# Patient Record
Sex: FEMALE | Race: BLACK OR AFRICAN AMERICAN | Employment: OTHER | URBAN - METROPOLITAN AREA
[De-identification: names, ages, dates, MRNs, and addresses within clinical notes are randomized per-mention and may not be internally consistent; named-entity substitution may affect disease eponyms.]

---

## 2017-11-20 ENCOUNTER — APPOINTMENT (OUTPATIENT)
Dept: CT IMAGING | Age: 67
DRG: 312 | End: 2017-11-20
Attending: INTERNAL MEDICINE
Payer: MEDICARE

## 2017-11-20 ENCOUNTER — HOSPITAL ENCOUNTER (INPATIENT)
Age: 67
LOS: 3 days | Discharge: ANOTHER HOSPITAL | DRG: 312 | End: 2017-11-23
Attending: INTERNAL MEDICINE | Admitting: EMERGENCY MEDICINE
Payer: MEDICARE

## 2017-11-20 DIAGNOSIS — R55 SYNCOPE AND COLLAPSE: Primary | ICD-10-CM

## 2017-11-20 DIAGNOSIS — I49.5 SICK SINUS SYNDROME (HCC): ICD-10-CM
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
## 2017-11-20 PROBLEM — R00.1 BRADYCARDIA: Status: ACTIVE | Noted: 2017-11-20
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
## 2017-11-20 PROBLEM — S00.83XA FACIAL HEMATOMA: Status: ACTIVE | Noted: 2017-11-20

## 2017-11-20 LAB
ALBUMIN SERPL-MCNC: 3.6 G/DL (ref 3.5–5)
ALBUMIN/GLOB SERPL: 1.1 {RATIO} (ref 1.1–2.2)
ALP SERPL-CCNC: 113 U/L (ref 45–117)
ALT SERPL-CCNC: 22 U/L (ref 12–78)
ANION GAP SERPL CALC-SCNC: 8 MMOL/L (ref 5–15)
AST SERPL-CCNC: 28 U/L (ref 15–37)
ATRIAL RATE: 67 BPM
BASOPHILS # BLD: 0 K/UL (ref 0–0.1)
BASOPHILS NFR BLD: 0 % (ref 0–1)
BILIRUB SERPL-MCNC: 0.5 MG/DL (ref 0.2–1)
BUN SERPL-MCNC: 23 MG/DL (ref 6–20)
BUN/CREAT SERPL: 26 (ref 12–20)
CALCIUM SERPL-MCNC: 9.5 MG/DL (ref 8.5–10.1)
CALCULATED P AXIS, ECG09: 54 DEGREES
CALCULATED R AXIS, ECG10: 50 DEGREES
CALCULATED T AXIS, ECG11: 73 DEGREES
CHLORIDE SERPL-SCNC: 104 MMOL/L (ref 97–108)
CK MB CFR SERPL CALC: 2 % (ref 0–2.5)
CK MB SERPL-MCNC: 4.4 NG/ML (ref 5–25)
CK SERPL-CCNC: 219 U/L (ref 26–192)
CO2 SERPL-SCNC: 28 MMOL/L (ref 21–32)
CREAT SERPL-MCNC: 0.87 MG/DL (ref 0.55–1.02)
DIAGNOSIS, 93000: NORMAL
EOSINOPHIL # BLD: 0.1 K/UL (ref 0–0.4)
EOSINOPHIL NFR BLD: 1 % (ref 0–7)
ERYTHROCYTE [DISTWIDTH] IN BLOOD BY AUTOMATED COUNT: 12.2 % (ref 11.5–14.5)
GLOBULIN SER CALC-MCNC: 3.3 G/DL (ref 2–4)
GLUCOSE SERPL-MCNC: 84 MG/DL (ref 65–100)
HCT VFR BLD AUTO: 39.3 % (ref 35–47)
HGB BLD-MCNC: 12.8 G/DL (ref 11.5–16)
LYMPHOCYTES # BLD: 0.9 K/UL (ref 0.8–3.5)
LYMPHOCYTES NFR BLD: 10 % (ref 12–49)
MCH RBC QN AUTO: 30.8 PG (ref 26–34)
MCHC RBC AUTO-ENTMCNC: 32.6 G/DL (ref 30–36.5)
MCV RBC AUTO: 94.5 FL (ref 80–99)
MONOCYTES # BLD: 0.5 K/UL (ref 0–1)
MONOCYTES NFR BLD: 6 % (ref 5–13)
NEUTS SEG # BLD: 6.7 K/UL (ref 1.8–8)
NEUTS SEG NFR BLD: 83 % (ref 32–75)
P-R INTERVAL, ECG05: 116 MS
PLATELET # BLD AUTO: 197 K/UL (ref 150–400)
POTASSIUM SERPL-SCNC: 4.1 MMOL/L (ref 3.5–5.1)
PROT SERPL-MCNC: 6.9 G/DL (ref 6.4–8.2)
Q-T INTERVAL, ECG07: 442 MS
QRS DURATION, ECG06: 72 MS
QTC CALCULATION (BEZET), ECG08: 467 MS
RBC # BLD AUTO: 4.16 M/UL (ref 3.8–5.2)
SODIUM SERPL-SCNC: 140 MMOL/L (ref 136–145)
TROPONIN I SERPL-MCNC: <0.04 NG/ML
VENTRICULAR RATE, ECG03: 67 BPM
WBC # BLD AUTO: 8.1 K/UL (ref 3.6–11)

## 2017-11-20 PROCEDURE — 74011250637 HC RX REV CODE- 250/637: Performed by: EMERGENCY MEDICINE

## 2017-11-20 PROCEDURE — 82550 ASSAY OF CK (CPK): CPT | Performed by: INTERNAL MEDICINE

## 2017-11-20 PROCEDURE — 96360 HYDRATION IV INFUSION INIT: CPT

## 2017-11-20 PROCEDURE — 84484 ASSAY OF TROPONIN QUANT: CPT | Performed by: INTERNAL MEDICINE

## 2017-11-20 PROCEDURE — 65660000001 HC RM ICU INTERMED STEPDOWN

## 2017-11-20 PROCEDURE — 36415 COLL VENOUS BLD VENIPUNCTURE: CPT | Performed by: INTERNAL MEDICINE

## 2017-11-20 PROCEDURE — 80053 COMPREHEN METABOLIC PANEL: CPT | Performed by: INTERNAL MEDICINE

## 2017-11-20 PROCEDURE — 85025 COMPLETE CBC W/AUTO DIFF WBC: CPT | Performed by: INTERNAL MEDICINE

## 2017-11-20 PROCEDURE — 93005 ELECTROCARDIOGRAM TRACING: CPT

## 2017-11-20 PROCEDURE — 70450 CT HEAD/BRAIN W/O DYE: CPT

## 2017-11-20 PROCEDURE — 99285 EMERGENCY DEPT VISIT HI MDM: CPT

## 2017-11-20 PROCEDURE — 72125 CT NECK SPINE W/O DYE: CPT

## 2017-11-20 PROCEDURE — 82553 CREATINE MB FRACTION: CPT | Performed by: INTERNAL MEDICINE

## 2017-11-20 PROCEDURE — 74011250636 HC RX REV CODE- 250/636: Performed by: INTERNAL MEDICINE

## 2017-11-20 RX ORDER — ENOXAPARIN SODIUM 100 MG/ML
20 INJECTION SUBCUTANEOUS EVERY 24 HOURS
Status: DISCONTINUED | OUTPATIENT
Start: 2017-11-21 | End: 2017-11-21

## 2017-11-20 RX ORDER — ACETAMINOPHEN 325 MG/1
650 TABLET ORAL
Status: DISCONTINUED | OUTPATIENT
Start: 2017-11-20 | End: 2017-11-23 | Stop reason: HOSPADM

## 2017-11-20 RX ORDER — ATORVASTATIN CALCIUM 10 MG/1
20 TABLET, FILM COATED ORAL DAILY
Status: DISCONTINUED | OUTPATIENT
Start: 2017-11-21 | End: 2017-11-23 | Stop reason: HOSPADM

## 2017-11-20 RX ORDER — TRAMADOL HYDROCHLORIDE 50 MG/1
50 TABLET ORAL
Status: DISCONTINUED | OUTPATIENT
Start: 2017-11-20 | End: 2017-11-21

## 2017-11-20 RX ORDER — SODIUM CHLORIDE 0.9 % (FLUSH) 0.9 %
5-10 SYRINGE (ML) INJECTION AS NEEDED
Status: DISCONTINUED | OUTPATIENT
Start: 2017-11-20 | End: 2017-11-23 | Stop reason: HOSPADM

## 2017-11-20 RX ORDER — CLOPIDOGREL BISULFATE 75 MG/1
75 TABLET ORAL DAILY
Status: DISCONTINUED | OUTPATIENT
Start: 2017-11-21 | End: 2017-11-23 | Stop reason: HOSPADM

## 2017-11-20 RX ORDER — GUAIFENESIN 100 MG/5ML
81 LIQUID (ML) ORAL DAILY
Status: DISCONTINUED | OUTPATIENT
Start: 2017-11-21 | End: 2017-11-23 | Stop reason: HOSPADM

## 2017-11-20 RX ORDER — SODIUM CHLORIDE 0.9 % (FLUSH) 0.9 %
5-10 SYRINGE (ML) INJECTION EVERY 8 HOURS
Status: DISCONTINUED | OUTPATIENT
Start: 2017-11-20 | End: 2017-11-23 | Stop reason: HOSPADM

## 2017-11-20 RX ORDER — ENOXAPARIN SODIUM 100 MG/ML
40 INJECTION SUBCUTANEOUS EVERY 24 HOURS
Status: DISCONTINUED | OUTPATIENT
Start: 2017-11-20 | End: 2017-11-20 | Stop reason: DRUGHIGH

## 2017-11-20 RX ADMIN — Medication 10 ML: at 19:10

## 2017-11-20 RX ADMIN — SODIUM CHLORIDE 500 ML: 900 INJECTION, SOLUTION INTRAVENOUS at 20:28

## 2017-11-20 RX ADMIN — Medication 10 ML: at 23:11

## 2017-11-20 RX ADMIN — ACETAMINOPHEN 650 MG: 325 TABLET, FILM COATED ORAL at 23:09

## 2017-11-20 NOTE — IP AVS SNAPSHOT
[facility] 
 
 
 [address] Kylee Roman Arndt Patient: Deondre Castano MRN: [id number] JRS:[id number] My Medications TAKE these medications as instructed Instructions Each Dose to Equal  
 Morning Noon Evening Bedtime  
 acetaminophen 500 mg tablet Commonly known as:  TYLENOL Your last dose was: Your next dose is: Take 500 mg by mouth four (4) times daily as needed for Pain. 500 mg  
    
   
   
   
  
 aspirin 81 mg chewable tablet Your last dose was: Your next dose is: Take 81 mg by mouth daily. 81 mg  
    
   
   
   
  
 atorvastatin 80 mg tablet Commonly known as:  LIPITOR Your last dose was: Your next dose is: Take 80 mg by mouth daily. 80 mg  
    
   
   
   
  
 citalopram 10 mg tablet Commonly known as:  Mary Honey Your last dose was: Your next dose is: Take 10 mg by mouth daily. 10 mg  
    
   
   
   
  
 lisinopril 2.5 mg tablet Commonly known as:  Penny Hernandez Your last dose was: Your next dose is: Take 2.5 mg by mouth daily. 2.5 mg  
    
   
   
   
  
 loratadine 10 mg tablet Commonly known as:  Goldie Greenfield Your last dose was: Your next dose is: Take 10 mg by mouth daily as needed. 10 mg MIRALAX 17 gram/dose powder Generic drug:  polyethylene glycol Your last dose was: Your next dose is: Take 17 g by mouth daily as needed for Other (constipation). 17 g  
    
   
   
   
  
 mirtazapine 15 mg tablet Commonly known as:  Santiago Sow Your last dose was: Your next dose is: Take 15 mg by mouth nightly. 15 mg  
    
   
   
   
  
 nitroglycerin 0.4 mg SL tablet Commonly known as:  NITROSTAT Your last dose was: Your next dose is: 0.4 mg by SubLINGual route every five (5) minutes as needed for Chest Pain. 0.4 mg  
    
   
   
   
  
 PLAVIX 75 mg Tab Generic drug:  clopidogrel Your last dose was: Your next dose is: Take 75 mg by mouth daily. 75 mg  
    
   
   
   
  
 therapeutic multivitamin tablet Commonly known as:  [facility] Your last dose was: Your next dose is: Take 1 Tab by mouth daily. 1 Tab * trimethoprim-sulfamethoxazole 160-800 mg per tablet Commonly known as:  BACTRIM DS, SEPTRA DS Your last dose was: Your next dose is: Take 1 Tab by mouth two (2) times a day. 1 Tab * trimethoprim-sulfamethoxazole 160-800 mg per tablet Commonly known as:  BACTRIM DS, SEPTRA DS Your last dose was: Your next dose is: Take 1 Tab by mouth every twelve (12) hours for 1 day. 1 Tab * Notice: This list has 2 medication(s) that are the same as other medications prescribed for you. Read the directions carefully, and ask your doctor or other care provider to review them with you. Where to Get Your Medications Information on where to get these meds will be given to you by the nurse or doctor. ! Ask your nurse or doctor about these medications  
  trimethoprim-sulfamethoxazole 160-800 mg per tablet

## 2017-11-20 NOTE — ED PROVIDER NOTES
[address] [facility]  EMERGENCY DEPARTMENT HISTORY AND PHYSICAL EXAM         Date of Service: 11/20/2017   Patient Name: Tatum Ron   YOB: 1950  Medical Record Number: [id number]    History of Presenting Illness     Chief Complaint   Patient presents with    Syncope     Syncopal episode on the toilet. HR in the 40's at the scene. Lac above left eye        History Provided By:  patient    Additional History:   Tatum Ron is a 69 y.o. female with PMhx significant for HTN, high cholesterol, CVA, MI, and CAD who presents via EMS to the ED with cc of a syncopal episode which happened while on the toilet today PTA. Per EMS, pt's HR was in the 40s on the scene. She also c/o a contusion to her left forehead and an associated headache. Her pain is mild. Pt states she has no complaints. She denies neck pain. Pt arrives to the ED with her wound dressed by EMS. She denies taking anticoagulants. Social Hx: - Tobacco, + EtOH, - Illicit Drugs    There are no other complaints, changes or physical findings at this time. Primary Care Provider: Kellen Mejia MD     Past History     Past Medical History:   Past Medical History:   Diagnosis Date    CAD (coronary artery disease)     Coronary stent     CVA     Heart attack     High cholesterol     Hypertension     Stroke [facility])     x12 last one sept 2009        Past Surgical History:   Past Surgical History:   Procedure Laterality Date    CARDIAC SURG PROCEDURE UNLIST      balloon placed, stents removed    HX ORTHOPAEDIC      left arm surgery for fx. 2010    HX TUBAL LIGATION          Family History:   History reviewed. No pertinent family history. Social History:   Social History   Substance Use Topics    Smoking status: Former Smoker    Smokeless tobacco: Never Used    Alcohol use Yes        Allergies:    Allergies   Allergen Reactions    Iodinated Contrast- Oral And Iv Dye Hives    Motrin [Ibuprofen] Itching    Penicillins Itching        Review of Systems   Review of Systems   Constitutional: Negative for chills and fever. HENT: Negative for congestion, rhinorrhea, sneezing and sore throat. Eyes: Negative for redness and visual disturbance. Respiratory: Negative for shortness of breath. Cardiovascular: Negative for leg swelling. Gastrointestinal: Negative for abdominal pain, nausea and vomiting. Genitourinary: Negative for difficulty urinating and frequency. Musculoskeletal: Negative for back pain, myalgias, neck pain and neck stiffness. Skin: Positive for wound (contusion to left forehead). Negative for rash. Neurological: Positive for syncope and headaches. Negative for dizziness and weakness. Hematological: Negative for adenopathy. All other systems reviewed and are negative. Physical Exam  Physical Exam   Constitutional: She is oriented to person, place, and time. She appears well-developed and well-nourished. Patient is thin. HENT:   Head: Normocephalic and atraumatic. Mouth/Throat: Oropharynx is clear and moist.   Eyes: Conjunctivae and EOM are normal. Pupils are equal, round, and reactive to light. Neck: Normal range of motion. Neck supple. Cardiovascular: Regular rhythm. Bradycardia present. Exam reveals no gallop and no friction rub. Murmur heard. Pulmonary/Chest: Effort normal and breath sounds normal. No respiratory distress. She has no wheezes. She has no rales. Abdominal: Soft. Bowel sounds are normal. She exhibits no distension. There is no tenderness. There is no rebound and no guarding. Musculoskeletal: Normal range of motion. She exhibits no edema or tenderness. Lymphadenopathy:     She has no cervical adenopathy. Neurological: She is alert and oriented to person, place, and time. She has normal strength. No cranial nerve deficit or sensory deficit. She displays a negative Romberg sign.  Coordination and gait normal.   No cranial nerve deficits. Skin: Skin is warm and dry. Ecchymosis noted. No lesion and no rash noted. Rash is not urticarial. She is not diaphoretic. No erythema. Large scalp hematoma and skin abrasion to left forehead. Bruising over left eye. Psychiatric: She has a normal mood and affect. Nursing note and vitals reviewed. Medical Decision Making   I am the first provider for this patient. I reviewed the vital signs, available nursing notes, past medical history, past surgical history, family history and social history. Provider Notes: DDx: hematoma, fall, syncope, SSS, ICH, intracranial bleed     ED Course:  6:33 PM   Initial assessment performed. The patients presenting problems have been discussed, and they are in agreement with the care plan formulated and outlined with them. I have encouraged them to ask questions as they arise throughout their visit. EKG interpretation: (Preliminary) 1833  Rhythm: sinus rhythm with premature supraventricular complexes; and regular . Rate (approx.): 67; Axis: normal; AK interval: normal; QRS interval: normal ; ST/T wave: nonspecific T wave abnormality  Written by Brady Braden ED Scribe as dictated by Ros Sullivan MD    Progress Notes:  CONSULT NOTE:  9:02 PM  Ros Sullivan MD spoke with Dr. Patricia Liang,  Specialty: Cardiology   Discussed patient's hx, disposition, and available diagnostic and imaging results. Reviewed care plans. Consultant agrees with plans as outlined. He will see in consultation. CONSULT NOTE:  9:28 PM  Ros Sullivan MD spoke with Dr. Faith Will,  Specialty: Hospitalist  Discussed patient's hx, disposition, and available diagnostic and imaging results. Reviewed care plans. Consultant agrees with plans as outlined. She will admit. 9:40 PM  Patient has been updated on plan. She agrees to being admitted.      Diagnostic Study Results   Labs -      Recent Results (from the past 12 hour(s))   EKG, 12 LEAD, INITIAL    Collection Time: 11/20/17  6:33 PM   Result Value Ref Range    Ventricular Rate 67 BPM    Atrial Rate 67 BPM    P-R Interval 116 ms    QRS Duration 72 ms    Q-T Interval 442 ms    QTC Calculation (Bezet) 467 ms    Calculated P Axis 54 degrees    Calculated R Axis 50 degrees    Calculated T Axis 73 degrees    Diagnosis       AV junctional rhythm; small P wave is blended in to proximal QRS   fragmentation; this competes with sinus bradycardia  Occasional junctional pairs with mild aberrancy in the second beat  midprecordial T wave changes suggestive of ischemia; these changes are   increased since the last tracing    Confirmed by [facility] Geronimo BALBUENA ([id number]) on 11/20/2017 8:19:51 PM     CK W/ REFLX CKMB    Collection Time: 11/20/17  8:15 PM   Result Value Ref Range     (H) 26 - 192 U/L   TROPONIN I    Collection Time: 11/20/17  8:15 PM   Result Value Ref Range    Troponin-I, Qt. <0.04 <4.52 ng/mL   METABOLIC PANEL, COMPREHENSIVE    Collection Time: 11/20/17  8:15 PM   Result Value Ref Range    Sodium 140 136 - 145 mmol/L    Potassium 4.1 3.5 - 5.1 mmol/L    Chloride 104 97 - 108 mmol/L    CO2 28 21 - 32 mmol/L    Anion gap 8 5 - 15 mmol/L    Glucose 84 65 - 100 mg/dL    BUN 23 (H) 6 - 20 MG/DL    Creatinine 0.87 0.55 - 1.02 MG/DL    BUN/Creatinine ratio 26 (H) 12 - 20      GFR est AA >60 >60 ml/min/1.73m2    GFR est non-AA >60 >60 ml/min/1.73m2    Calcium 9.5 8.5 - 10.1 MG/DL    Bilirubin, total 0.5 0.2 - 1.0 MG/DL    ALT (SGPT) 22 12 - 78 U/L    AST (SGOT) 28 15 - 37 U/L    Alk.  phosphatase 113 45 - 117 U/L    Protein, total 6.9 6.4 - 8.2 g/dL    Albumin 3.6 3.5 - 5.0 g/dL    Globulin 3.3 2.0 - 4.0 g/dL    A-G Ratio 1.1 1.1 - 2.2     CBC WITH AUTOMATED DIFF    Collection Time: 11/20/17  8:15 PM   Result Value Ref Range    WBC 8.1 3.6 - 11.0 K/uL    RBC 4.16 3.80 - 5.20 M/uL    HGB 12.8 11.5 - 16.0 g/dL    HCT 39.3 35.0 - 47.0 %    MCV 94.5 80.0 - 99.0 FL    MCH 30.8 26.0 - 34.0 PG    MCHC 32.6 30.0 - 36.5 g/dL    RDW 12.2 11.5 - 14.5 %    PLATELET 614 108 - 086 K/uL    NEUTROPHILS 83 (H) 32 - 75 %    LYMPHOCYTES 10 (L) 12 - 49 %    MONOCYTES 6 5 - 13 %    EOSINOPHILS 1 0 - 7 %    BASOPHILS 0 0 - 1 %    ABS. NEUTROPHILS 6.7 1.8 - 8.0 K/UL    ABS. LYMPHOCYTES 0.9 0.8 - 3.5 K/UL    ABS. MONOCYTES 0.5 0.0 - 1.0 K/UL    ABS. EOSINOPHILS 0.1 0.0 - 0.4 K/UL    ABS. BASOPHILS 0.0 0.0 - 0.1 K/UL   CK-MB,QUANT. Collection Time: 11/20/17  8:15 PM   Result Value Ref Range    CK - MB 4.4 (H) <3.6 NG/ML    CK-MB Index 2.0 0.0 - 2.5         Radiologic Studies -  The following have been ordered and reviewed:    CT Results  (Last 48 hours)               11/20/17 1940  CT HEAD WO CONT Final result    Impression:  IMPRESSION: Chronic ischemic changes again shown. No acute intracranial   findings. Soft tissue hematoma superficial to the supraorbital left frontal   bone. Narrative:  EXAM:  CT HEAD WO CONT       INDICATION:   Syncope/fainting       COMPARISON: CT 7/1/2015. CONTRAST:  None. TECHNIQUE: Unenhanced CT of the head was performed using 5 mm images. Brain and   bone windows were generated. CT dose reduction was achieved through use of a   standardized protocol tailored for this examination and automatic exposure   control for dose modulation. FINDINGS:   Mild ex vacuo prominence of the frontal horn of the right lateral ventricles   again shown with otherwise normal ventricular size and contour appear. Abundant   bilateral periventricular white matter diminished attenuation is again shown   consistent with chronic small vessel ischemic disease the white matter. Chronic   infarction of the right external capsule and inferior left cerebellar hemisphere   is again shown. There is no acute cranial hemorrhage or mass effect. No   extra-axial collection or unenhanced CT imaging evidence for acute infarction is   shown. The basilar cisterns are open.  Prominent soft tissue hematoma overlies   the supraorbital left frontal bone. The bone windows demonstrate no   abnormalities. The visualized portions of the paranasal sinuses and mastoid air   cells are clear. 11/20/17 1940  CT SPINE CERV WO CONT Final result    Impression:  IMPRESSION:   C5-6 degenerative disc disease. No fracture or dislocation demonstrated. Narrative:  EXAM:  CT CERVICAL SPINE WITHOUT CONTRAST       INDICATION:   Neck pain following trauma. COMPARISON: None. CONTRAST:  None. TECHNIQUE: Multislice helical CT of the cervical spine was performed without   intravenous contrast administration. Sagittal and coronal reconstructions were   generated. CT dose reduction was achieved through use of a standardized   protocol tailored for this examination and automatic exposure control for dose   modulation. FINDINGS:       Bone mineralization is normal . There is normal vertebral body height. Alignment   is anatomic. Moderate disc space narrowing is present at C5-6 with otherwise   normal disc height. Apposing endplate marginal osteophytes are present at the   C5-6 level. The posterior processes and facet joints are intact. No osseous   canal or foraminal stenosis is shown. No paraspinal soft tissue mass is shown. Vital Signs-Reviewed the patient's vital signs.    Patient Vitals for the past 12 hrs:   Temp Pulse Resp BP SpO2   11/20/17 1911 - 71 16 - 99 %   11/20/17 1831 97.4 °F (36.3 °C) 68 17 144/67 100 %       Medications Given in the ED:  Medications   sodium chloride (NS) flush 5-10 mL (not administered)   sodium chloride (NS) flush 5-10 mL (10 mL IntraVENous Given 11/20/17 2010)   sodium chloride (NS) flush 5-10 mL (not administered)   sodium chloride (NS) flush 5-10 mL (not administered)   acetaminophen (TYLENOL) tablet 650 mg (not administered)   enoxaparin (LOVENOX) injection 40 mg (not administered)   atorvastatin (LIPITOR) tablet 20 mg (not administered)   traMADol (ULTRAM) tablet 50 mg (not administered)   aspirin chewable tablet 81 mg (not administered)   clopidogrel (PLAVIX) tablet 75 mg (not administered)   sodium chloride 0.9 % bolus infusion 500 mL (500 mL IntraVENous New Bag 11/20/17 2028)       Diagnosis:  Clinical Impression:   1. Syncope and collapse    2. Sick sinus syndrome ([facility] [address]         Plan:  1: Admit to Hospitalist    ADMIT NOTE:  9:28 PM  Patient is being admitted to the hospital by Dr. Parish Vitale. The results of their tests and reasons for their admission have been discussed with them and/or available family. They convey agreement and understanding for the need to be admitted and for their admission diagnosis. Consultation has been made with the inpatient physician specialist for hospitalization. _______________________________   Attestations:     Attestations:   Attestation Note:  This note is prepared by [facility], acting as Scribe for MD Remedios Chairez MD: The scribe's documentation has been prepared under my direction and personally reviewed by me in its entirety.  I confirm that the note above accurately reflects all work, treatment, procedures, and medical decision making performed by me.  _______________________________

## 2017-11-20 NOTE — ED NOTES
Emergency Department Nursing Plan of Care       The Nursing Plan of Care is developed from the Nursing assessment and Emergency Department Attending provider initial evaluation. The plan of care may be reviewed in the ED Provider note. The Plan of Care was developed with the following considerations:   Patient / Family readiness to learn indicated by:verbalized understanding  Persons(s) to be included in education: patient  Barriers to Learning/Limitations:No    Signed     Samuelra Bench    11/20/2017   6:36 PM    See nursing assessment  Patient is alert and oriented x 4 and in no acute distress at this time. Respirations are at a regular rate, depth, and pattern. Patient updated on plan of care and has no questions or concerns at this time.

## 2017-11-21 PROBLEM — E78.5 HYPERLIPIDEMIA: Status: ACTIVE | Noted: 2017-11-21

## 2017-11-21 PROBLEM — I25.10 CAD (CORONARY ARTERY DISEASE): Status: ACTIVE | Noted: 2017-11-21

## 2017-11-21 LAB
CK MB CFR SERPL CALC: 1.3 % (ref 0–2.5)
CK MB SERPL-MCNC: 5.3 NG/ML (ref 5–25)
CK SERPL-CCNC: 409 U/L (ref 26–192)
TROPONIN I SERPL-MCNC: 0.04 NG/ML

## 2017-11-21 PROCEDURE — 97162 PT EVAL MOD COMPLEX 30 MIN: CPT

## 2017-11-21 PROCEDURE — 82550 ASSAY OF CK (CPK): CPT

## 2017-11-21 PROCEDURE — 93306 TTE W/DOPPLER COMPLETE: CPT

## 2017-11-21 PROCEDURE — 36415 COLL VENOUS BLD VENIPUNCTURE: CPT

## 2017-11-21 PROCEDURE — G8978 MOBILITY CURRENT STATUS: HCPCS

## 2017-11-21 PROCEDURE — 97116 GAIT TRAINING THERAPY: CPT

## 2017-11-21 PROCEDURE — 74011250637 HC RX REV CODE- 250/637: Performed by: EMERGENCY MEDICINE

## 2017-11-21 PROCEDURE — G8979 MOBILITY GOAL STATUS: HCPCS

## 2017-11-21 PROCEDURE — 74011250636 HC RX REV CODE- 250/636: Performed by: EMERGENCY MEDICINE

## 2017-11-21 PROCEDURE — 74011250637 HC RX REV CODE- 250/637: Performed by: INTERNAL MEDICINE

## 2017-11-21 PROCEDURE — 93005 ELECTROCARDIOGRAM TRACING: CPT

## 2017-11-21 PROCEDURE — 65660000000 HC RM CCU STEPDOWN

## 2017-11-21 PROCEDURE — 84484 ASSAY OF TROPONIN QUANT: CPT

## 2017-11-21 PROCEDURE — 74011250636 HC RX REV CODE- 250/636: Performed by: INTERNAL MEDICINE

## 2017-11-21 PROCEDURE — 97530 THERAPEUTIC ACTIVITIES: CPT

## 2017-11-21 RX ORDER — LISINOPRIL 5 MG/1
2.5 TABLET ORAL DAILY
Status: DISCONTINUED | OUTPATIENT
Start: 2017-11-22 | End: 2017-11-23 | Stop reason: HOSPADM

## 2017-11-21 RX ORDER — SODIUM CHLORIDE 9 MG/ML
75 INJECTION, SOLUTION INTRAVENOUS CONTINUOUS
Status: DISCONTINUED | OUTPATIENT
Start: 2017-11-21 | End: 2017-11-22

## 2017-11-21 RX ORDER — ENOXAPARIN SODIUM 100 MG/ML
30 INJECTION SUBCUTANEOUS EVERY 24 HOURS
Status: DISCONTINUED | OUTPATIENT
Start: 2017-11-21 | End: 2017-11-23 | Stop reason: HOSPADM

## 2017-11-21 RX ORDER — CITALOPRAM 10 MG/1
10 TABLET ORAL DAILY
COMMUNITY

## 2017-11-21 RX ORDER — CITALOPRAM 20 MG/1
10 TABLET, FILM COATED ORAL DAILY
Status: DISCONTINUED | OUTPATIENT
Start: 2017-11-22 | End: 2017-11-23 | Stop reason: HOSPADM

## 2017-11-21 RX ORDER — SULFAMETHOXAZOLE AND TRIMETHOPRIM 800; 160 MG/1; MG/1
1 TABLET ORAL EVERY 12 HOURS
Status: DISCONTINUED | OUTPATIENT
Start: 2017-11-21 | End: 2017-11-23 | Stop reason: HOSPADM

## 2017-11-21 RX ORDER — POLYETHYLENE GLYCOL 3350 17 G/17G
17 POWDER, FOR SOLUTION ORAL
Status: DISCONTINUED | OUTPATIENT
Start: 2017-11-21 | End: 2017-11-23 | Stop reason: HOSPADM

## 2017-11-21 RX ORDER — MIRTAZAPINE 15 MG/1
15 TABLET, FILM COATED ORAL
Status: DISCONTINUED | OUTPATIENT
Start: 2017-11-21 | End: 2017-11-23 | Stop reason: HOSPADM

## 2017-11-21 RX ORDER — NITROGLYCERIN 0.4 MG/1
0.4 TABLET SUBLINGUAL AS NEEDED
Status: DISCONTINUED | OUTPATIENT
Start: 2017-11-21 | End: 2017-11-23 | Stop reason: HOSPADM

## 2017-11-21 RX ORDER — POLYETHYLENE GLYCOL 3350 17 G/17G
17 POWDER, FOR SOLUTION ORAL
COMMUNITY

## 2017-11-21 RX ORDER — THERA TABS 400 MCG
1 TAB ORAL DAILY
COMMUNITY

## 2017-11-21 RX ORDER — LISINOPRIL 2.5 MG/1
2.5 TABLET ORAL DAILY
COMMUNITY

## 2017-11-21 RX ORDER — SULFAMETHOXAZOLE AND TRIMETHOPRIM 800; 160 MG/1; MG/1
1 TABLET ORAL 2 TIMES DAILY
COMMUNITY
Start: 2017-11-17 | End: 2017-11-24

## 2017-11-21 RX ORDER — THERA TABS 400 MCG
1 TAB ORAL DAILY
Status: DISCONTINUED | OUTPATIENT
Start: 2017-11-21 | End: 2017-11-23 | Stop reason: HOSPADM

## 2017-11-21 RX ORDER — NITROGLYCERIN 0.4 MG/1
0.4 TABLET SUBLINGUAL
COMMUNITY

## 2017-11-21 RX ORDER — MIRTAZAPINE 15 MG/1
15 TABLET, FILM COATED ORAL
COMMUNITY

## 2017-11-21 RX ORDER — ATORVASTATIN CALCIUM 80 MG/1
80 TABLET, FILM COATED ORAL DAILY
COMMUNITY

## 2017-11-21 RX ADMIN — ACETAMINOPHEN 650 MG: 325 TABLET, FILM COATED ORAL at 09:36

## 2017-11-21 RX ADMIN — CLOPIDOGREL 75 MG: 75 TABLET, FILM COATED ORAL at 09:27

## 2017-11-21 RX ADMIN — ACETAMINOPHEN 650 MG: 325 TABLET, FILM COATED ORAL at 16:31

## 2017-11-21 RX ADMIN — ENOXAPARIN SODIUM 30 MG: 30 INJECTION, SOLUTION INTRAVENOUS; SUBCUTANEOUS at 09:27

## 2017-11-21 RX ADMIN — SODIUM CHLORIDE 75 ML/HR: 900 INJECTION, SOLUTION INTRAVENOUS at 02:24

## 2017-11-21 RX ADMIN — ASPIRIN 81 MG: 81 TABLET, CHEWABLE ORAL at 09:27

## 2017-11-21 RX ADMIN — Medication 10 ML: at 16:33

## 2017-11-21 RX ADMIN — THERA TABS 1 TABLET: TAB at 16:22

## 2017-11-21 RX ADMIN — SODIUM CHLORIDE 75 ML/HR: 900 INJECTION, SOLUTION INTRAVENOUS at 16:31

## 2017-11-21 RX ADMIN — SULFAMETHOXAZOLE AND TRIMETHOPRIM 1 TABLET: 800; 160 TABLET ORAL at 16:22

## 2017-11-21 NOTE — ED NOTES
Pt resting in bed awaiting transfer for admission, pt appears to be in no distress, will continue to monitor pt.

## 2017-11-21 NOTE — PROGRESS NOTES
[facility] DAILY ANTICOAGULANT ASSESSMENT    Estimated body mass index is 15.54 kg/(m^2) as calculated from the following:    Height as of this encounter: 170.2 cm (67\"). Weight as of this encounter: 45 kg (99 lb 3.3 oz). Estimated Creatinine Clearance: 43.4 mL/min (based on Cr of 0.87). Lab Results   Component Value Date/Time    Creatinine 0.87 11/20/2017 08:15 PM     Lab Results   Component Value Date/Time    HGB 12.8 11/20/2017 08:15 PM     Lab Results   Component Value Date/Time    PLATELET 905 19/67/1210 08:15 PM     Assessment: wt=45 kg and lovenox dose chaged from 40 mg to 30 mg daily.

## 2017-11-21 NOTE — PROGRESS NOTES
RRAT Score: 11  Initial Assessment: CM reviewed chart and met with patient for discharge planning. CM verified patients address and contact number as correct on the facesheet. Pt presented to ED with syncope and a fall. It was reported that patient fell while on the toilet. [facility] [place] [facility] Her sister lives next door and her niece assists as well. Patient reported that her niece manages her monthly income, which is around $1000. Patient is retired. Patient consented for CM to make appointment arrangements. Patients PCP is Dr. Charbel Browne at [facility]. CM has notified [facility] and will obtain an appointment once patient's discharge date is known. Patient will not need assistance with obtaining medications. Patient uses [facility] to obtain medications. Patient reported that she has a walker and wheelchair that she uses in the home. Patient is agreeable to home health or a personal care aide if she qualifies. CM has referred patient to [employer] for health coaching. CM has referred to [address] [place] [address] personal care aid  program for additional assistance in her home for a short time period. Emergency Contact:  Rose Ruff (niece) [phone]   Pertinent Medical Hx: see H&P     Transition Plan: Home with outpatient services. Involve patient/caregiver in assessment, planning, education and implement of intervention. Yes. CM will continue to follow case for discharge planning. CM daily patient care huddles/interdisciplinary rounds. Rounded with IDT. CM will handoff to [address] [place] [address] or PCP practice. [facility] or [address] coordination of resources. CM will further assess if needed. Care Management Interventions  PCP Verified by CM: Yes [facility])  Palliative Care Criteria Met (RRAT>21 & CHF Dx)?: No  Mode of Transport at Discharge:  Other (see comment)  Transition of Care Consult (CM Consult): Discharge Planning  Discharge Durable Medical Equipment: No (Chriss has a walker and wheelchair)  Physical Therapy Consult: Yes  Occupational Therapy Consult: Yes  Current Support Network: Lives Alone, Family Lives Nearby  Confirm Follow Up Transport: Family  Discharge Location  Discharge Placement: Home with outpatient services    Linda ACKERMAN  [facility]

## 2017-11-21 NOTE — PROGRESS NOTES
Problem: Mobility Impaired (Adult and Pediatric)  Goal: *Acute Goals and Plan of Care (Insert Text)  Physical Therapy Goals  Initiated 11/21/2017  1. Patient will move from supine to sit and sit to supine , scoot up and down and roll side to side in bed with modified independence within 7 day(s). 2.  Patient will transfer from bed to chair and chair to bed with modified independence using the least restrictive device within 7 day(s). 3.  Patient will perform sit to stand with modified independence within 7 day(s). 4.  Patient will ambulate with modified independence for 250 feet with the least restrictive device within 7 day(s). 5.  Patient will ascend/descend 10 stairs with  handrail(s) with minimal assistance/contact guard assist within 7 day(s). physical Therapy EVALUATION  Seen 1315 to 1400  Patient: Sami Casillas (9 y.o. female)  Date: 11/21/2017  Primary Diagnosis: Syncope and collapse        Precautions:   Fall    ASSESSMENT :  Based on the objective data described below, the patient presents with a significant history of falls, decreased strength and functional mobility. Pt required min assist for bed mobility and mod assist for sit to stand transfer x 3 reps. She was able to take a couple of steps over to the chair. Sitting EOB and post activity in sitting BP stable. She reports having a r/w and wc at home. PTA, pt was living alone with a sister next door. Will assess D/C needs as POC continues but will likely need increased supervision/ LIS. Patient will benefit from skilled intervention to address the above impairments.   Patients rehabilitation potential is considered to be Good  Factors which may influence rehabilitation potential include:   []         None noted  []         Mental ability/status  [x]         Medical condition  [x]         Home/family situation and support systems  [x]         Safety awareness  []         Pain tolerance/management  []         Other:      PLAN :  Recommendations and Planned Interventions:  []           Bed Mobility Training             []    Neuromuscular Re-Education  [x]           Transfer Training                   []    Orthotic/Prosthetic Training  [x]           Gait Training                         []    Modalities  [x]           Therapeutic Exercises           []    Edema Management/Control  [x]           Therapeutic Activities            [x]    Patient and Family Training/Education  []           Other (comment):    Frequency/Duration: Patient will be followed by physical therapy  2 times a week to address goals. Discharge Recommendations: Home Health vs Skilled Nursing Facility  Further Equipment Recommendations for Discharge: TBD     SUBJECTIVE:   Patient stated I fell at home.     OBJECTIVE DATA SUMMARY:   HISTORY:    Past Medical History:   Diagnosis Date    CAD (coronary artery disease)     Coronary stent     CVA     Heart attack     High cholesterol     Hypertension     Stroke [facility])     x12 last one sept 2009     Past Surgical History:   Procedure Laterality Date    CARDIAC SURG PROCEDURE UNLIST  2016    Stents    HX ORTHOPAEDIC      left arm surgery for fx. 2010    HX TUBAL LIGATION       Prior Level of Function/Home Situation: Living  Alone with family close by. History of falls  Personal factors and/or comorbidities impacting plan of care:     Home Situation  Home Environment: Apartment  One/Two Story Residence: One story  Living Alone: Yes  Support Systems: Family member(s)  Patient Expects to be Discharged to[de-identified] Private residence  Current DME Used/Available at Home: Maryruth Earing, straight, Shower chair  Tub or Shower Type: Tub/Shower combination    EXAMINATION/PRESENTATION/DECISION MAKING:   Critical Behavior:  Neurologic State: Alert  Orientation Level: Oriented X4  Cognition: Follows commands     Hearing:   Auditory  Auditory Impairment: None    Range Of Motion:  AROM: Generally decreased, functional  Strength:    Strength: Generally decreased, functional  Functional Mobility:  Bed Mobility:  Rolling: Minimum assistance  Supine to Sit: Minimum assistance  Transfers:  Sit to Stand: Minimum assistance; Moderate assistance  Bed to Chair: Minimum assistance  Balance:   Sitting: Intact; With support  Standing: Impaired  Standing - Static: Fair  Standing - Dynamic : Fair  Ambulation/Gait Training:  Distance (ft): 5 Feet (ft)  Assistive Device: Walker, rolling  Ambulation - Level of Assistance: Moderate assistance  Base of Support: Narrowed  Speed/Sofia: Pace decreased (<100 feet/min)    Therapeutic Exercises:     EXERCISE   Sets   Reps   Active Active Assist   Passive   Comments   LAQ 1 10 [x]                        []                        []                           Marching in place 1 10 [x]                        []                        []                           Ankle pumps 1 10 [x]                        []                        []                                      Functional Measure:  Barthel Index:    Bathin  Bladder: 5  Bowels: 5  Groomin  Dressin  Feeding: 10  Mobility: 0  Stairs: 0  Toilet Use: 5  Transfer (Bed to Chair and Back): 10  Total: 45       Barthel and G-code impairment scale:  Percentage of impairment CH  0% CI  1-19% CJ  20-39% CK  40-59% CL  60-79% CM  80-99% CN  100%   Barthel Score 0-100 100 99-80 79-60 59-40 20-39 1-19   0   Barthel Score 0-20 20 17-19 13-16 9-12 5-8 1-4 0      The Barthel ADL Index: Guidelines  1. The index should be used as a record of what a patient does, not as a record of what a patient could do. 2. The main aim is to establish degree of independence from any help, physical or verbal, however minor and for whatever reason. 3. The need for supervision renders the patient not independent. 4. A patient's performance should be established using the best available evidence.  Asking the patient, friends/relatives and nurses are the usual sources, but direct observation and common sense are also important. However direct testing is not needed. 5. Usually the patient's performance over the preceding 24-48 hours is important, but occasionally longer periods will be relevant. 6. Middle categories imply that the patient supplies over 50 per cent of the effort. 7. Use of aids to be independent is allowed. Christine Kirk., Barthel, D.W. ([id number]). Functional evaluation: the Barthel Index. [address] (14)2. MAK Paula Ace, Jose Ryan., Patrizia Woods., Aidee Webbg, [address] [facility] (1999). Measuring the change indisability after inpatient rehabilitation; comparison of the responsiveness of the Barthel Index and Functional Crockett Measure. Journal of Neurology, Neurosurgery, and Psychiatry, 66(4), 435-697. CASSANDRA Hernández, NICK Monson, & [place] Mcwilliams M.A. (2004.) Assessment of post-stroke quality of life in cost-effectiveness studies: The usefulness of the Barthel Index and the EuroQoL-5D. Quality of Life Research, 13, 559-88       G codes: In compliance with CMSs Claims Based Outcome Reporting, the following G-code set was chosen for this patient based on their primary functional limitation being treated: The outcome measure chosen to determine the severity of the functional limitation was the Barthel with a score of 45/100 which was correlated with the impairment scale.     ? Mobility - Walking and Moving Around:     - CURRENT STATUS: CK - 40%-59% impaired, limited or restricted    - GOAL STATUS: CI - 1%-19% impaired, limited or restricted    - D/C STATUS:  ---------------To be determined---------------      Physical Therapy Evaluation Charge Determination   History Examination Presentation Decision-Making   MEDIUM  Complexity : 1-2 comorbidities / personal factors will impact the outcome/ POC  LOW Complexity : 1-2 Standardized tests and measures addressing body structure, function, activity limitation and / or participation in recreation  LOW Complexity : Stable, uncomplicated  LOW Complexity : FOTO score of       Based on the above components, the patient evaluation is determined to be of the following complexity level: LOW   Activity Tolerance: Tolerated fair  Please refer to the flowsheet for vital signs taken during this treatment. After treatment:   [x]         Patient left in no apparent distress sitting up in chair  []         Patient left in no apparent distress in bed  [x]         Call bell left within reach  [x]         Nursing notified  []         Caregiver present  []         Bed alarm activated    COMMUNICATION/EDUCATION:   The patients plan of care was discussed with: Registered Nurse. [x]         Fall prevention education was provided and the patient/caregiver indicated understanding. [x]         Patient/family have participated as able in goal setting and plan of care. [x]         Patient/family agree to work toward stated goals and plan of care. []         Patient understands intent and goals of therapy, but is neutral about his/her participation. []         Patient is unable to participate in goal setting and plan of care.     Thank you for this referral.  Char Rose, PT   Time Calculation: 45 mins

## 2017-11-21 NOTE — ED NOTES
(late entry) antony, , and this rn, referenced this chart to look up pt for family member who was trying to visit (didn't realize that they weren't in the ED anymore).

## 2017-11-21 NOTE — CONSULTS
Cardiology consultation:    I was asked by Dr. Malika Almeida from the emergency room to assess this 64-year-old female with a history of heart disease after she came to the emergency room with a fall with related head injury. The fall occurred getting off the toilet. She admits to being lightheaded when first standing up but she denies syncope. Her past history is positive for coronary artery disease as well as for at least one episode of CVA. She was discovered to be bradycardic with regular rhythm by the emergency responders, and the decision was made to admit. At present she complains of left sided orbital and temporal headache corresponding to focal injury from the fall, but she denies lightheadedness. She denies chest pain as well as exertional dyspnea but it would appear that she leads a fairly inactive life. No arrhythmias have been witnessed since admission. She last underwent perfusion testing in August 2015 at which point it was normal.  She is being followed by [facility]. She has a history of overactive bladder which is manifesting here. The interview and exam was interfered with repeatedly by urinary frequency. On examination today, her only complaints relate to posttraumatic orbital swelling on the left side. On the mat she feels well. She denies chest pain both at present and during the circumstances of admission. She denies syncope otherwise. Lungs are clear to auscultation. Cardiac auscultation shows slow regular rhythm with normal quality S1 and S2, there is a faint and abbreviated midsystolic murmur along the left sternal border. Peripheral pulses are intact. There is no edema, there is no sign of DVT. Jugular veins are flat.   Blood pressure is 129/76 with resting pulse of 75 (regular) lowest observed pulse since admission to telemetry is 55 bpm.    Chest x-ray shows an unremarkable mediastinal silhouette:          Hemogram is unremarkable except for a mildly elevated neutrophil count.  Chemistry profile is as follows:    Results for Radha Shaw (MRN [id number]) as of 11/21/2017 17:34   Ref. Range 8/18/2015 04:31 11/20/2017 20:15 11/21/2017 04:51   Sodium Latest Ref Range: 136 - 145 mmol/L  140    Potassium Latest Ref Range: 3.5 - 5.1 mmol/L  4.1    Chloride Latest Ref Range: 97 - 108 mmol/L  104    CO2 Latest Ref Range: 21 - 32 mmol/L  28    Anion gap Latest Ref Range: 5 - 15 mmol/L  8    Glucose Latest Ref Range: 65 - 100 mg/dL  84    BUN Latest Ref Range: 6 - 20 MG/DL  23 (H)    Creatinine Latest Ref Range: 0.55 - 1.02 MG/DL  0.87    BUN/Creatinine ratio Latest Ref Range: 12 - 20    26 (H)    Calcium Latest Ref Range: 8.5 - 10.1 MG/DL  9.5    GFR est non-AA Latest Ref Range: >60 ml/min/1.73m2  >60    GFR est AA Latest Ref Range: >60 ml/min/1.73m2  >60    Bilirubin, total Latest Ref Range: 0.2 - 1.0 MG/DL  0.5    Protein, total Latest Ref Range: 6.4 - 8.2 g/dL  6.9    Albumin Latest Ref Range: 3.5 - 5.0 g/dL  3.6    Globulin Latest Ref Range: 2.0 - 4.0 g/dL  3.3    A-G Ratio Latest Ref Range: 1.1 - 2.2    1.1    ALT (SGPT) Latest Ref Range: 12 - 78 U/L  22    AST Latest Ref Range: 15 - 37 U/L  28    Alk. phosphatase Latest Ref Range: 45 - 117 U/L  113    Triglyceride Latest Ref Range: <150 MG/DL 94     Cholesterol, total Latest Ref Range: <200 MG/     HDL Cholesterol Latest Units: MG/DL 51     CHOL/HDL Ratio Latest Ref Range: 0 - 5.0   2.4     LDL, calculated Latest Ref Range: 0 - 100 MG/DL 52.2     VLDL, calculated Latest Units: MG/DL 18.8     CK Latest Ref Range: 26 - 192 U/L   409 (H)   CK Latest Ref Range: 26 - 192 U/L  219 (H)    CK - MB Latest Ref Range: <3.6 NG/ML  4.4 (H) 5.3 (H)   CK-MB Index Latest Ref Range: 0.0 - 2.5    2.0 1.3   Troponin-I, Qt.  Latest Ref Range: <0.05 ng/mL <0.04 <0.04 0.04        echocardiography: Bedside study performed today shows a mild focal hypokinetic segment in the distal anteroseptal wall and apex of the LV with normal global LV function and without other significant structural disease. This finding could be ischemic. The initial twelve-lead EKG shows junctional rhythm competing with occasional atrial premature beats. Organized sinus rhythm is not demonstrated. There is mid precordial T-wave inversion which could easily represent an ischemic process. This matches the anatomy on the echo. These changes were present to a lesser extent in 2015 during a hospital admission wherein perfusion testing was normal.  At that time she was in sinus rhythm. Impression: EKG changes suggest ischemia but they are not new    Nonetheless, echocardiography shows a focal hypokinetic segment that does not look infarcted in the same zone (distal anteroseptal)    She is no longer in sinus rhythm but in junctional rhythm with disorganized competing atrial prematurity    The collapse and fall may have been the consequence of a conversion out of sinus rhythm with brief bradycardia    We need to exclude active ischemia mostly because of the echo    She may need a pacemaker    Plan:  Perfusion testing with Lexiscan challenge    Careful monitoring of rhythm in general and also during the test, although AV node function does not appear to be a problem    Continued telemetry with recognition of the possibility that she might require permanent pacing    Continue with temporary transcutaneous pacing electrodes in place, anteroposterior location.     Continue to hold beta-blocker    Thank you for this consultation    Rickey Boone MD [facility]

## 2017-11-21 NOTE — H&P
Hospitalist Admission Note    NAME: Debbie Farmer   :  1950   MRN:  [id number]   Room Number: FBL6/95  @ [facility]     Date/Time:  2017 9:01 AM    Patient PCP: Precious Levin MD  ______________________________________________________________________    My assessment of this patient's clinical condition and my plan of care is as follows. Assessment / Plan:    Fall as cause of accidental injury at home as place of occurrence:POA  Recurrent Falls  Gait abnormality  Head CT: Chronic ischemic changes again shown. No acute intracranial  findings. Soft tissue hematoma superficial to the supraorbital left frontal  bone  Consult PT/OT  She may benefit from rehab/SNF placement    Hx multiple CVA`s with residual left sided weakness in lower leg:POA  On ASA/Plavix    HTN with chronic Bradycardia:POA  Resume home meds    Hx CAD (coronary artery disease s/p 3 JOHNATHON )  Procedure done at [facility] on 2017  On ASA/plavix and Statin    Underweight  BMI    Code Status: full  Surrogate Decision Maker:sister    DVT Prophylaxis: Lovenox  GI Prophylaxis: not indicated    Baseline: independent, lives lb. Sister helps with ADL`s        Subjective:     PATIENT ADMITTED OVERNIGHT BY THE TELEHOSPITALIST    CHIEF COMPLAINT: fall    HISTORY OF PRESENT ILLNESS:     Debbie Farmer is a 69 y.o.  female with PMH of HTN,Multiple CVA,CAD,Recurrent Falls who presents to ED with c/o fall. Patient reports having a GLF as she was heading to the bathroom. She states she hit her head ON the furnace on her left side and now has bruise on left forehead and periorbital swelling . she denies any loss of consciousness prior to or after the fall . She has had multiple Strokes in the past and left-sided residual lower extremity weakness from it. She ambulates with the help of a walker . Patient reports having a history of gait abnormality and recurrent falls .  She lives alone and has assistance of her sister who checks on her. In ED,head CT was done which was unremarkable . Cardio for consulted for bradycardia. We were asked to admit for work up and evaluation of the above problems. Past Medical History:   Diagnosis Date    CAD (coronary artery disease)     Coronary stent     CVA     Heart attack     High cholesterol     Hypertension     Stroke [facility])     x12 last one sept 2009        Past Surgical History:   Procedure Laterality Date    CARDIAC SURG PROCEDURE UNLIST  2016    Stents    HX ORTHOPAEDIC      left arm surgery for fx. 2010    HX TUBAL LIGATION         Social History   Substance Use Topics    Smoking status: Former Smoker    Smokeless tobacco: Never Used    Alcohol use Yes        History reviewed. No pertinent family history. Allergies   Allergen Reactions    Iodinated Contrast- Oral And Iv Dye Hives    Motrin [Ibuprofen] Itching    Penicillins Itching        Prior to Admission medications    Medication Sig Start Date End Date Taking? Authorizing Provider   traMADol (ULTRAM) 50 mg tablet Take 1 Tab by mouth every six (6) hours as needed. Max Daily Amount: 200 mg. 8/18/15   Rio Landry MD   metoprolol (LOPRESSOR) 25 mg tablet Take 0.5 Tabs by mouth two (2) times a day. 8/18/15   iRo Landry MD   atorvastatin (LIPITOR) 20 mg tablet Take 20 mg by mouth daily. Indications: HYPERCHOLESTEROLEMIA    Historical Provider   loratadine (CLARITIN) 10 mg tablet Take 10 mg by mouth daily. Aby Bob MD   clopidogrel (PLAVIX) 75 mg tablet Take 75 mg by mouth daily. Aby Bob MD   acetaminophen (TYLENOL) 500 mg tablet Take 500 mg by mouth every six (6) hours as needed for Pain. Aby Bob MD   aspirin 81 mg chewable tablet Take 81 mg by mouth daily. Aby Bob MD   ergocalciferol (ERGOCALCIFEROL) 50,000 unit capsule Take 50,000 Units by mouth.     Aby Bob MD       REVIEW OF SYSTEMS:     I am not able to complete the review of systems because: The patient is intubated and sedated    The patient has altered mental status due to his acute medical problems    The patient has baseline aphasia from prior stroke(s)    The patient has baseline dementia and is not reliable historian    The patient is in acute medical distress and unable to provide information           Total of 12 systems reviewed as follows:       POSITIVE= underlined text  Negative = text not underlined  General:  fever, chills, sweats, generalized weakness, weight loss/gain,      loss of appetite   Eyes:    blurred vision, eye pain, loss of vision, double vision  ENT:    rhinorrhea, pharyngitis   Respiratory:   cough, sputum production, SOB, PETTY, wheezing, pleuritic pain   Cardiology:   chest pain, palpitations, orthopnea, PND, edema, syncope   Gastrointestinal:  abdominal pain , N/V, diarrhea, dysphagia, constipation, bleeding   Genitourinary:  frequency, urgency, dysuria, hematuria, incontinence   Muskuloskeletal :  arthralgia, myalgia, back pain  Hematology:  easy bruising, nose or gum bleeding, lymphadenopathy   Dermatological: rash, ulceration, pruritis, color change / jaundice  Endocrine:   hot flashes or polydipsia   Neurological:  headache, dizziness, confusion, focal weakness, paresthesia,     Speech difficulties, memory loss, gait difficulty  Psychological: Feelings of anxiety, depression, agitation    Objective:   VITALS:    Visit Vitals    /65    Pulse 61    Temp 98.4 °F (36.9 °C)    Resp 19    Ht 5' 7\" (1.702 m)    Wt 45 kg (99 lb 3.3 oz)    SpO2 100%    BMI 15.54 kg/m2       PHYSICAL EXAM:    General:    Alert, cooperative, no distress, appears stated age. HEENT: Atraumatic, anicteric sclerae, pink conjunctivae,Bruise and abrasion on left forehead with left periorbital swelling      No oral ulcers, mucosa moist, throat clear, dentition fair  Neck:  Supple, symmetrical,  thyroid: non tender  Lungs:   Clear to auscultation bilaterally.   No Wheezing or Rhonchi. No rales. Chest wall:  No tenderness  No Accessory muscle use. Heart:   Regular  rhythm,  No  murmur   No edema  Abdomen:   Soft, non-tender. Not distended. Bowel sounds normal  Extremities: No cyanosis. No clubbing,      Skin turgor normal, Capillary refill normal, Radial dial pulse 2+  Skin:     Not pale. Not Jaundiced  No rashes   Psych:  Good insight. Not depressed. Not anxious or agitated. Neurologic: EOMs intact. No facial asymmetry. No aphasia or slurred speech. Symmetrical strength, Sensation grossly intact. Alert and oriented X 4. 4/5 motor strength on LLE    ______________________________________________________________________    Care Plan discussed with:  Patient/Family, Nurse and     Expected  Disposition:  SNF/LTC  ________________________________________________________________________  TOTAL TIME:  70 Minutes    Critical Care Provided     Minutes non procedure based      Comments     Reviewed previous records   >50% of visit spent in counseling and coordination of care x Discussion with patient and/or family and questions answered       ________________________________________________________________________  Signed: Luann Beckford MD    Procedures: see electronic medical records for all procedures/Xrays and details which were not copied into this note but were reviewed prior to creation of Plan.     LAB DATA REVIEWED:    Recent Results (from the past 24 hour(s))   EKG, 12 LEAD, INITIAL    Collection Time: 11/20/17  6:33 PM   Result Value Ref Range    Ventricular Rate 67 BPM    Atrial Rate 67 BPM    P-R Interval 116 ms    QRS Duration 72 ms    Q-T Interval 442 ms    QTC Calculation (Bezet) 467 ms    Calculated P Axis 54 degrees    Calculated R Axis 50 degrees    Calculated T Axis 73 degrees    Diagnosis       AV junctional rhythm; small P wave is blended in to proximal QRS   fragmentation; this competes with sinus bradycardia  Occasional junctional pairs with mild aberrancy in the second beat  midprecordial T wave changes suggestive of ischemia; these changes are   increased since the last tracing    Confirmed by Quita Beck MD, [facility] ([id number]) on 11/20/2017 8:19:51 PM     CK W/ REFLX CKMB    Collection Time: 11/20/17  8:15 PM   Result Value Ref Range     (H) 26 - 192 U/L   TROPONIN I    Collection Time: 11/20/17  8:15 PM   Result Value Ref Range    Troponin-I, Qt. <0.04 <4.76 ng/mL   METABOLIC PANEL, COMPREHENSIVE    Collection Time: 11/20/17  8:15 PM   Result Value Ref Range    Sodium 140 136 - 145 mmol/L    Potassium 4.1 3.5 - 5.1 mmol/L    Chloride 104 97 - 108 mmol/L    CO2 28 21 - 32 mmol/L    Anion gap 8 5 - 15 mmol/L    Glucose 84 65 - 100 mg/dL    BUN 23 (H) 6 - 20 MG/DL    Creatinine 0.87 0.55 - 1.02 MG/DL    BUN/Creatinine ratio 26 (H) 12 - 20      GFR est AA >60 >60 ml/min/1.73m2    GFR est non-AA >60 >60 ml/min/1.73m2    Calcium 9.5 8.5 - 10.1 MG/DL    Bilirubin, total 0.5 0.2 - 1.0 MG/DL    ALT (SGPT) 22 12 - 78 U/L    AST (SGOT) 28 15 - 37 U/L    Alk. phosphatase 113 45 - 117 U/L    Protein, total 6.9 6.4 - 8.2 g/dL    Albumin 3.6 3.5 - 5.0 g/dL    Globulin 3.3 2.0 - 4.0 g/dL    A-G Ratio 1.1 1.1 - 2.2     CBC WITH AUTOMATED DIFF    Collection Time: 11/20/17  8:15 PM   Result Value Ref Range    WBC 8.1 3.6 - 11.0 K/uL    RBC 4.16 3.80 - 5.20 M/uL    HGB 12.8 11.5 - 16.0 g/dL    HCT 39.3 35.0 - 47.0 %    MCV 94.5 80.0 - 99.0 FL    MCH 30.8 26.0 - 34.0 PG    MCHC 32.6 30.0 - 36.5 g/dL    RDW 12.2 11.5 - 14.5 %    PLATELET 166 777 - 460 K/uL    NEUTROPHILS 83 (H) 32 - 75 %    LYMPHOCYTES 10 (L) 12 - 49 %    MONOCYTES 6 5 - 13 %    EOSINOPHILS 1 0 - 7 %    BASOPHILS 0 0 - 1 %    ABS. NEUTROPHILS 6.7 1.8 - 8.0 K/UL    ABS. LYMPHOCYTES 0.9 0.8 - 3.5 K/UL    ABS. MONOCYTES 0.5 0.0 - 1.0 K/UL    ABS. EOSINOPHILS 0.1 0.0 - 0.4 K/UL    ABS. BASOPHILS 0.0 0.0 - 0.1 K/UL   CK-MB,QUANT.     Collection Time: 11/20/17  8:15 PM   Result Value Ref Range    CK - MB 4.4 (H) <3.6 NG/ML    CK-MB Index 2.0 0.0 - 2.5     TROPONIN I    Collection Time: 11/21/17  4:51 AM   Result Value Ref Range    Troponin-I, Qt. 0.04 <0.05 ng/mL   CK W/ CKMB & INDEX    Collection Time: 11/21/17  4:51 AM   Result Value Ref Range     (H) 26 - 192 U/L    CK - MB 5.3 (H) <3.6 NG/ML    CK-MB Index 1.3 0.0 - 2.5

## 2017-11-21 NOTE — PROGRESS NOTES
Pharmacy Medication Reconciliation     Recommendations/Findings:   1) pt gets medications from [facility] [phone]  2) she started Bactrim DS I bid 17 for 7 days therapy for UTI. 3) no longer taking metoprolol 25 mg , tramadol, and ergocalciferol  4) added citalopram 10 mg, lisinopril 2.5 mg, miralax, mirtazapine 15 mg, MVI, and ntg 0.4 mg sub tablet  5) clarified atorvastatin strength as 80 mg    -Clarified PTA med list with [facility]. PTA medication list was corrected to the following:     Prior to Admission Medications   Prescriptions Last Dose Informant Patient Reported? Taking?   acetaminophen (TYLENOL) 500 mg tablet Unknown at Unknown time Other Yes No   Sig: Take 500 mg by mouth four (4) times daily as needed for Pain. aspirin 81 mg chewable tablet Unknown at Unknown time Other Yes No   Sig: Take 81 mg by mouth daily. atorvastatin (LIPITOR) 80 mg tablet  Other Yes Yes   Sig: Take 80 mg by mouth daily. citalopram (CELEXA) 10 mg tablet  Other Yes Yes   Sig: Take 10 mg by mouth daily. clopidogrel (PLAVIX) 75 mg tablet Unknown at Unknown time Other Yes No   Sig: Take 75 mg by mouth daily. lisinopril (PRINIVIL, ZESTRIL) 2.5 mg tablet  Other Yes Yes   Sig: Take 2.5 mg by mouth daily. loratadine (CLARITIN) 10 mg tablet Unknown at Unknown time Other Yes No   Sig: Take 10 mg by mouth daily as needed. mirtazapine (REMERON) 15 mg tablet  Other Yes Yes   Sig: Take 15 mg by mouth nightly. nitroglycerin (NITROSTAT) 0.4 mg SL tablet  Other Yes Yes   Si.4 mg by SubLINGual route every five (5) minutes as needed for Chest Pain.   polyethylene glycol (MIRALAX) 17 gram/dose powder  Other Yes Yes   Sig: Take 17 g by mouth daily as needed for Other (constipation). therapeutic multivitamin [facility]) tablet  Other Yes Yes   Sig: Take 1 Tab by mouth daily. trimethoprim-sulfamethoxazole (BACTRIM DS, SEPTRA DS) 160-800 mg per tablet  Other Yes Yes   Sig: Take 1 Tab by mouth two (2) times a day. Facility-Administered Medications: None          Thank you,  Kristyn Price, [facility]

## 2017-11-21 NOTE — PROGRESS NOTES
Attended interdisciplinary rounds on patient floor where patient care was discussed. Ciara Franks M.S.   Spiritual Care Department  If needs arise please call Giuliana-TORO ([id number])

## 2017-11-21 NOTE — H&P
GENERAL GENERIC H&P/CONSULT    Subjective: I fell getting up from the toilet    69year old with history of CAD and CVA here via EMS after she fell getting up from the toilet. She states she may have been a little lightheaded when she got up but vehemently denies passing out either before or when she hit the floor. She states her sister was in the house and called 911. She denies vomiting diarrhea, fever, chest pain, SOB, or other problems. She also denies any injury other than the bump on her head which she pointed out. Her head and neck CT revealed no acute findings. She was reportedly bradycardic in the 40-50s on arrival but asymptomatic at that time. She was admitted after ED discussed case with cardiology who wanted her watched for symptomatic bradycardia. Past Medical History:   Diagnosis Date    CAD (coronary artery disease)     Coronary stent     CVA     Heart attack     High cholesterol     Hypertension     Stroke [facility])     x12 last one sept 2009      Past Surgical History:   Procedure Laterality Date    CARDIAC SURG PROCEDURE UNLIST  2016    Stents    HX ORTHOPAEDIC      left arm surgery for fx. 2010    HX TUBAL LIGATION        Prior to Admission medications    Medication Sig Start Date End Date Taking? Authorizing Provider   traMADol (ULTRAM) 50 mg tablet Take 1 Tab by mouth every six (6) hours as needed. Max Daily Amount: 200 mg. 8/18/15   Alfonzo Pierson MD   metoprolol (LOPRESSOR) 25 mg tablet Take 0.5 Tabs by mouth two (2) times a day. 8/18/15   Alfonzo Pierson MD   atorvastatin (LIPITOR) 20 mg tablet Take 20 mg by mouth daily. Indications: HYPERCHOLESTEROLEMIA    Historical Provider   loratadine (CLARITIN) 10 mg tablet Take 10 mg by mouth daily. Aby Bob MD   clopidogrel (PLAVIX) 75 mg tablet Take 75 mg by mouth daily. Aby Bob MD   acetaminophen (TYLENOL) 500 mg tablet Take 500 mg by mouth every six (6) hours as needed for Pain.     Aby Bob MD aspirin 81 mg chewable tablet Take 81 mg by mouth daily. Aby Bob MD   ergocalciferol (ERGOCALCIFEROL) 50,000 unit capsule Take 50,000 Units by mouth. Aby Bob MD     Allergies   Allergen Reactions    Iodinated Contrast- Oral And Iv Dye Hives    Motrin [Ibuprofen] Itching    Penicillins Itching      Social History   Substance Use Topics    Smoking status: Former Smoker    Smokeless tobacco: Never Used    Alcohol use Yes      History reviewed. No pertinent family history. Review of Systems   Constitutional: Negative for activity change, appetite change, chills, fatigue and fever. HENT: Positive for facial swelling. Negative for ear pain and voice change. Gastrointestinal: Negative for abdominal distention, abdominal pain, blood in stool, constipation, diarrhea, nausea and vomiting. Endocrine: Positive for polyuria. Genitourinary: Negative. Musculoskeletal: Negative for back pain and neck pain. Skin: Positive for wound. Neurological: Positive for light-headedness. Negative for syncope, speech difficulty and weakness. Psychiatric/Behavioral: Negative. All other systems reviewed and are negative. Objective:          Patient Vitals for the past 8 hrs:   BP Temp Pulse Resp SpO2 Height Weight   11/20/17 2300 97/71 98.4 °F (36.9 °C) 72 11 98 % - 45 kg (99 lb 3.3 oz)   11/20/17 2255 99/81 - 80 18 - - -   11/20/17 2200 95/65 97.9 °F (36.6 °C) 67 14 99 % - -   11/20/17 2100 123/76 - 80 19 99 % - -   11/20/17 2022 143/69 - 85 20 99 % - -   11/20/17 2000 - - 68 13 100 % - -   11/20/17 1911 - - 71 16 99 % - -   11/20/17 1831 144/67 97.4 °F (36.3 °C) 68 17 100 % 5' 7\" (1.702 m) 38.6 kg (85 lb)     Physical Exam   Nursing note and vitals reviewed. Constitutional: She is oriented to person, place, and time. She appears well-developed. No distress. HENT:   Head: Normocephalic.    Bruise and abrasion on left forehead with left periorbital swelling   Eyes: Pupils are equal, round, and reactive to light. Neck: Normal range of motion. Cardiovascular: Normal rate and regular rhythm. Slightly kendra   Pulmonary/Chest: Effort normal and breath sounds normal. No respiratory distress. She has no wheezes. She has no rales. Abdominal: Soft. Bowel sounds are normal. She exhibits no distension. Musculoskeletal: Normal range of motion. She exhibits no edema or deformity. Neurological: She is alert and oriented to person, place, and time. She exhibits normal muscle tone. Coordination normal.   Skin: Skin is warm and dry. No rash noted. No pallor. Psychiatric: She has a normal mood and affect.         Labs:    Recent Results (from the past 24 hour(s))   EKG, 12 LEAD, INITIAL    Collection Time: 11/20/17  6:33 PM   Result Value Ref Range    Ventricular Rate 67 BPM    Atrial Rate 67 BPM    P-R Interval 116 ms    QRS Duration 72 ms    Q-T Interval 442 ms    QTC Calculation (Bezet) 467 ms    Calculated P Axis 54 degrees    Calculated R Axis 50 degrees    Calculated T Axis 73 degrees    Diagnosis       AV junctional rhythm; small P wave is blended in to proximal QRS   fragmentation; this competes with sinus bradycardia  Occasional junctional pairs with mild aberrancy in the second beat  midprecordial T wave changes suggestive of ischemia; these changes are   increased since the last tracing    Confirmed by Nic Hyatt MD, [facility] ([id number]) on 11/20/2017 8:19:51 PM     CK W/ REFLX CKMB    Collection Time: 11/20/17  8:15 PM   Result Value Ref Range     (H) 26 - 192 U/L   TROPONIN I    Collection Time: 11/20/17  8:15 PM   Result Value Ref Range    Troponin-I, Qt. <0.04 <4.37 ng/mL   METABOLIC PANEL, COMPREHENSIVE    Collection Time: 11/20/17  8:15 PM   Result Value Ref Range    Sodium 140 136 - 145 mmol/L    Potassium 4.1 3.5 - 5.1 mmol/L    Chloride 104 97 - 108 mmol/L    CO2 28 21 - 32 mmol/L    Anion gap 8 5 - 15 mmol/L    Glucose 84 65 - 100 mg/dL    BUN 23 (H) 6 - 20 MG/DL    Creatinine 0.87 0.55 - 1.02 MG/DL    BUN/Creatinine ratio 26 (H) 12 - 20      GFR est AA >60 >60 ml/min/1.73m2    GFR est non-AA >60 >60 ml/min/1.73m2    Calcium 9.5 8.5 - 10.1 MG/DL    Bilirubin, total 0.5 0.2 - 1.0 MG/DL    ALT (SGPT) 22 12 - 78 U/L    AST (SGOT) 28 15 - 37 U/L    Alk. phosphatase 113 45 - 117 U/L    Protein, total 6.9 6.4 - 8.2 g/dL    Albumin 3.6 3.5 - 5.0 g/dL    Globulin 3.3 2.0 - 4.0 g/dL    A-G Ratio 1.1 1.1 - 2.2     CBC WITH AUTOMATED DIFF    Collection Time: 11/20/17  8:15 PM   Result Value Ref Range    WBC 8.1 3.6 - 11.0 K/uL    RBC 4.16 3.80 - 5.20 M/uL    HGB 12.8 11.5 - 16.0 g/dL    HCT 39.3 35.0 - 47.0 %    MCV 94.5 80.0 - 99.0 FL    MCH 30.8 26.0 - 34.0 PG    MCHC 32.6 30.0 - 36.5 g/dL    RDW 12.2 11.5 - 14.5 %    PLATELET 153 715 - 415 K/uL    NEUTROPHILS 83 (H) 32 - 75 %    LYMPHOCYTES 10 (L) 12 - 49 %    MONOCYTES 6 5 - 13 %    EOSINOPHILS 1 0 - 7 %    BASOPHILS 0 0 - 1 %    ABS. NEUTROPHILS 6.7 1.8 - 8.0 K/UL    ABS. LYMPHOCYTES 0.9 0.8 - 3.5 K/UL    ABS. MONOCYTES 0.5 0.0 - 1.0 K/UL    ABS. EOSINOPHILS 0.1 0.0 - 0.4 K/UL    ABS. BASOPHILS 0.0 0.0 - 0.1 K/UL   CK-MB,QUANT. Collection Time: 11/20/17  8:15 PM   Result Value Ref Range    CK - MB 4.4 (H) <3.6 NG/ML    CK-MB Index 2.0 0.0 - 2.5       EXAM:  CT HEAD WO CONT     INDICATION:   Syncope/fainting     COMPARISON: CT 7/1/2015.     CONTRAST:  None.     TECHNIQUE: Unenhanced CT of the head was performed using 5 mm images. Brain and  bone windows were generated. CT dose reduction was achieved through use of a  standardized protocol tailored for this examination and automatic exposure  control for dose modulation.       FINDINGS:  Mild ex vacuo prominence of the frontal horn of the right lateral ventricles  again shown with otherwise normal ventricular size and contour appear. Abundant  bilateral periventricular white matter diminished attenuation is again shown  consistent with chronic small vessel ischemic disease the white matter. Chronic  infarction of the right external capsule and inferior left cerebellar hemisphere  is again shown. There is no acute cranial hemorrhage or mass effect. No  extra-axial collection or unenhanced CT imaging evidence for acute infarction is  shown. The basilar cisterns are open. Prominent soft tissue hematoma overlies  the supraorbital left frontal bone. The bone windows demonstrate no  abnormalities. The visualized portions of the paranasal sinuses and mastoid air  cells are clear.     IMPRESSION  IMPRESSION: Chronic ischemic changes again shown. No acute intracranial  findings.  Soft tissue hematoma superficial to the supraorbital left frontal  bone.       Assessment:  Principal Problem:    Fall as cause of accidental injury at home as place of occurrence (11/20/2017)    Active Problems:    Bradycardia (11/20/2017)      Facial hematoma (11/20/2017)        Plan: Monitor for symptomatic bradycardia, Ciara Lew consulted by ED, order placed, will hold B blocker for possible bradycardia, none actually documented by nursing, ED MD stated kendra 40-50 when he saw her    Signed:  Phuc Rinaldi MD 11/20/2017

## 2017-11-21 NOTE — ED NOTES
Bedside and Verbal shift change report given to NICOLÁS Lindsey (oncoming nurse) by Alma Gan (offgoing nurse). Report included the following information SBAR, ED Summary, Procedure Summary and Recent Results.

## 2017-11-22 ENCOUNTER — APPOINTMENT (OUTPATIENT)
Dept: NUCLEAR MEDICINE | Age: 67
DRG: 312 | End: 2017-11-22
Attending: INTERNAL MEDICINE
Payer: MEDICARE

## 2017-11-22 LAB
ANION GAP SERPL CALC-SCNC: 4 MMOL/L (ref 5–15)
BUN SERPL-MCNC: 16 MG/DL (ref 6–20)
BUN/CREAT SERPL: 23 (ref 12–20)
CALCIUM SERPL-MCNC: 8.4 MG/DL (ref 8.5–10.1)
CHLORIDE SERPL-SCNC: 108 MMOL/L (ref 97–108)
CO2 SERPL-SCNC: 27 MMOL/L (ref 21–32)
CREAT SERPL-MCNC: 0.69 MG/DL (ref 0.55–1.02)
GLUCOSE SERPL-MCNC: 76 MG/DL (ref 65–100)
MAGNESIUM SERPL-MCNC: 1.6 MG/DL (ref 1.6–2.4)
POTASSIUM SERPL-SCNC: 4.4 MMOL/L (ref 3.5–5.1)
SODIUM SERPL-SCNC: 139 MMOL/L (ref 136–145)

## 2017-11-22 PROCEDURE — 36415 COLL VENOUS BLD VENIPUNCTURE: CPT

## 2017-11-22 PROCEDURE — 97165 OT EVAL LOW COMPLEX 30 MIN: CPT | Performed by: OCCUPATIONAL THERAPIST

## 2017-11-22 PROCEDURE — 80048 BASIC METABOLIC PNL TOTAL CA: CPT

## 2017-11-22 PROCEDURE — 93017 CV STRESS TEST TRACING ONLY: CPT

## 2017-11-22 PROCEDURE — 97116 GAIT TRAINING THERAPY: CPT

## 2017-11-22 PROCEDURE — 74011250637 HC RX REV CODE- 250/637: Performed by: INTERNAL MEDICINE

## 2017-11-22 PROCEDURE — 65660000000 HC RM CCU STEPDOWN

## 2017-11-22 PROCEDURE — 74011250636 HC RX REV CODE- 250/636: Performed by: INTERNAL MEDICINE

## 2017-11-22 PROCEDURE — 74011250636 HC RX REV CODE- 250/636: Performed by: EMERGENCY MEDICINE

## 2017-11-22 PROCEDURE — 97535 SELF CARE MNGMENT TRAINING: CPT | Performed by: OCCUPATIONAL THERAPIST

## 2017-11-22 PROCEDURE — 74011250637 HC RX REV CODE- 250/637: Performed by: EMERGENCY MEDICINE

## 2017-11-22 PROCEDURE — A9500 TC99M SESTAMIBI: HCPCS

## 2017-11-22 PROCEDURE — 83735 ASSAY OF MAGNESIUM: CPT

## 2017-11-22 RX ORDER — MAGNESIUM SULFATE HEPTAHYDRATE 40 MG/ML
2 INJECTION, SOLUTION INTRAVENOUS
Status: COMPLETED | OUTPATIENT
Start: 2017-11-22 | End: 2017-11-22

## 2017-11-22 RX ORDER — SODIUM CHLORIDE 0.9 % (FLUSH) 0.9 %
10 SYRINGE (ML) INJECTION AS NEEDED
Status: DISCONTINUED | OUTPATIENT
Start: 2017-11-22 | End: 2017-11-23 | Stop reason: HOSPADM

## 2017-11-22 RX ORDER — SODIUM CHLORIDE 0.9 % (FLUSH) 0.9 %
SYRINGE (ML) INJECTION
Status: DISPENSED
Start: 2017-11-22 | End: 2017-11-22

## 2017-11-22 RX ADMIN — Medication 10 ML: at 20:56

## 2017-11-22 RX ADMIN — SULFAMETHOXAZOLE AND TRIMETHOPRIM 1 TABLET: 800; 160 TABLET ORAL at 13:49

## 2017-11-22 RX ADMIN — Medication 10 ML: at 12:14

## 2017-11-22 RX ADMIN — CLOPIDOGREL 75 MG: 75 TABLET, FILM COATED ORAL at 13:50

## 2017-11-22 RX ADMIN — CITALOPRAM HYDROBROMIDE 10 MG: 20 TABLET ORAL at 13:50

## 2017-11-22 RX ADMIN — ACETAMINOPHEN 650 MG: 325 TABLET, FILM COATED ORAL at 20:55

## 2017-11-22 RX ADMIN — THERA TABS 1 TABLET: TAB at 13:50

## 2017-11-22 RX ADMIN — LISINOPRIL 2.5 MG: 5 TABLET ORAL at 13:49

## 2017-11-22 RX ADMIN — MIRTAZAPINE 15 MG: 15 TABLET, FILM COATED ORAL at 02:22

## 2017-11-22 RX ADMIN — Medication 10 ML: at 12:13

## 2017-11-22 RX ADMIN — SODIUM CHLORIDE 75 ML/HR: 900 INJECTION, SOLUTION INTRAVENOUS at 06:12

## 2017-11-22 RX ADMIN — ATORVASTATIN CALCIUM 20 MG: 10 TABLET, FILM COATED ORAL at 02:22

## 2017-11-22 RX ADMIN — ATORVASTATIN CALCIUM 20 MG: 10 TABLET, FILM COATED ORAL at 20:55

## 2017-11-22 RX ADMIN — Medication 10 ML: at 12:10

## 2017-11-22 RX ADMIN — REGADENOSON 0.4 MG: 0.08 INJECTION, SOLUTION INTRAVENOUS at 12:10

## 2017-11-22 RX ADMIN — Medication 10 ML: at 12:15

## 2017-11-22 RX ADMIN — NITROGLYCERIN 0.5 INCH: 20 OINTMENT TOPICAL at 15:45

## 2017-11-22 RX ADMIN — Medication 10 ML: at 06:12

## 2017-11-22 RX ADMIN — MIRTAZAPINE 15 MG: 15 TABLET, FILM COATED ORAL at 20:55

## 2017-11-22 RX ADMIN — Medication 10 ML: at 14:00

## 2017-11-22 RX ADMIN — ENOXAPARIN SODIUM 30 MG: 30 INJECTION, SOLUTION INTRAVENOUS; SUBCUTANEOUS at 15:46

## 2017-11-22 RX ADMIN — ACETAMINOPHEN 650 MG: 325 TABLET, FILM COATED ORAL at 03:46

## 2017-11-22 RX ADMIN — MAGNESIUM SULFATE HEPTAHYDRATE 2 G: 40 INJECTION, SOLUTION INTRAVENOUS at 06:10

## 2017-11-22 RX ADMIN — ASPIRIN 81 MG: 81 TABLET, CHEWABLE ORAL at 13:49

## 2017-11-22 RX ADMIN — SULFAMETHOXAZOLE AND TRIMETHOPRIM 1 TABLET: 800; 160 TABLET ORAL at 20:55

## 2017-11-22 NOTE — PROGRESS NOTES
Bedside and Verbal shift change report given to me (oncoming nurse) by Rachel Kaur RN (offgoing nurse). Report included the following information SBAR, Intake/Output and Cardiac Rhythm sinus rhythm, PACs,      OOB to chair x 2. Patient is following instructions to call for help when desirous of getting OOB. PLeasant and cooperative to care. Family members present, including niece, grandniece and son. Ambulating to BR x 2 with PT and walker and x3 with Nurse and walker. Seen by Dr. Lelia Severino who spent extensive time with patient and family members answering questions. Bedside and Verbal shift change report given to Rachel Kaur RN (oncoming nurse) by me (offgoing nurse). Report included the following information SBAR, Intake/Output, MAR and Accordion.

## 2017-11-22 NOTE — PROGRESS NOTES
Problem: Altered nutrition  Goal: *Optimize nutritional status  Initial Nutrition Assessment:    INTERVENTIONS/RECOMMENDATIONS:   ·  cardiac diet as tolerated  ·  Enlive and pudding as supplements  · Supplements: Commercial supplement  ·  vit D3 gelcap; 2000 IUs/day  ·  vit B12 2000 mcg/day  ·  enc po and fluid intake      ASSESSMENT:   RN consult for weight/BMI. Pt admitted after fall in the bathroom resulting in head/facial injury, minor rhabdo. Ht notable for CVA, CAD, hyperlipidemia. BMIc/w underweight; hydration status on admission poor, nutrition status indicators affected. Pt presents with moderate malnutrition as evidenced by BMI, muscle wasting, poor po intake and labs. Diet Order: Cardiac  % Eaten:  No data found. Pertinent Medications: [x]Reviewed []Other  Pertinent Labs: [x]Reviewed []Other  Food Allergies: [x]None []Other   Last BM:    []Active     []Hyperactive  [x]Hypoactive       [] Absent BS  Skin:    [] Intact   [] Incision  [] Breakdown  [x] Other:hematoma: face    Anthropometrics:   Height: 5' 7\" (170.2 cm) Weight: 45 kg (99 lb 3.3 oz)   IBW (%IBW): 61.2 kg (135 lb) (73.49 %) UBW (%UBW):   (  %)   Last Weight Metrics:  Weight Loss Metrics 11/22/2017 8/18/2015 7/2/2015 1/11/2015 8/5/2014 4/10/2014 5/27/2012   Today's Wt 99 lb 3.3 oz 105 lb 13.1 oz 109 lb 2 oz 108 lb 128 lb 4.9 oz 125 lb 127 lb   BMI 15.54 kg/m2 16.32 kg/m2 16.83 kg/m2 16.66 kg/m2 19.79 kg/m2 19.57 kg/m2 19.31 kg/m2       BMI: Body mass index is 15.54 kg/(m^2). This BMI is indicative of:   [x]Underweight    []Normal    []Overweight    [] Obesity   [] Extreme Obesity (BMI>40)     Estimated Nutrition Needs (Based on):   1840 Kcals/day (434 X 1.3AF + 300 kcal) , 69 g (1.7 g/kg) Protein  Carbohydrate:  At Least 130 g/day  Fluids: 1650mL/day (1ml/kcal)    Pt expected to meet estimated nutrient needs: []Yes [x]No    NUTRITION DIAGNOSES:   Problem:  Inadequate energy intake      Etiology: related to poor diet, poor hunger awareness, poor po intake, dehydration     Signs/Symptoms: as evidenced by BMI, labs,       NUTRITION INTERVENTIONS:      Supplements: Commercial supplement              GOAL:   PO intake > 60% most meals    LEARNING NEEDS (Diet, Food/Nutrient-Drug Interaction):    [x] None Identified   [] Identified and Education Provided/Documented   [] Identified and Pt declined/was not appropriate     Cultureal, Yazidi, OR Ethnic Dietary Needs:    [x] None Identified   [] Identified and Addressed     [x] Interdisciplinary Care Plan Reviewed/Documented    [x] Discharge Planning:  Healthful diet with supplements     MONITORING /EVALUATION:   Behavioral-Environmental Outcomes: Acceptance of assist with eating  Food/Nutrient Intake Outcomes:  Total energy intake, Oral fluids amount  Physical Signs/Symptoms Outcomes: Acid-base balance, Lean body mass, fat free mass, Electrolyte and renal profile, Head and neck    NUTRITION RISK:    [] High              [x] Moderate           []  Low  []  Minimal/Uncompromised    PT SEEN FOR:    []  MD Consult: []Calorie Count      []Diabetic Diet Education        []Diet Education     []Electrolyte Management     [x]General Nutrition Management and Supplements     []Management of Tube Feeding     []TPN Recommendations    [x]  RN Referral:  []MST score >=2     []Enteral/Parenteral Nutrition PTA     []Pregnant: Gestational DM or Multigestation     []Pressure Ulcer/Wound Care needs        [x]  Low BMI  []  DTR Referral       Maria Eugenia Wagner, PhD, [address]   Pager [phone]

## 2017-11-22 NOTE — PROGRESS NOTES
PROGRESS NOTE    The patient Alona Krabbe a 69 y.o. female arrived on 11/22/2017 for an appointment in cardiology. Patient was transported to cardiology outpatient unit by Sanjay Willson RN by wheelchair. RN verifies test explanation by physician with patient. Reviews test and allows for questions. No further questions. Medical history and allergies reviewed  and noted. Home Meds reviewed. Prior to Admission medications    Medication Sig Start Date End Date Taking? Authorizing Provider   atorvastatin (LIPITOR) 80 mg tablet Take 80 mg by mouth daily. Yes Historical Provider   citalopram (CELEXA) 10 mg tablet Take 10 mg by mouth daily. Yes Historical Provider   lisinopril (PRINIVIL, ZESTRIL) 2.5 mg tablet Take 2.5 mg by mouth daily. Yes Historical Provider   polyethylene glycol (MIRALAX) 17 gram/dose powder Take 17 g by mouth daily as needed for Other (constipation). Yes Historical Provider   mirtazapine (REMERON) 15 mg tablet Take 15 mg by mouth nightly. Yes Historical Provider   therapeutic multivitamin (THERAGRAN) tablet Take 1 Tab by mouth daily. Yes Historical Provider   nitroglycerin (NITROSTAT) 0.4 mg SL tablet 0.4 mg by SubLINGual route every five (5) minutes as needed for Chest Pain. Yes Historical Provider   trimethoprim-sulfamethoxazole (BACTRIM DS, SEPTRA DS) 160-800 mg per tablet Take 1 Tab by mouth two (2) times a day. 11/17/17 11/24/17 Yes Historical Provider   loratadine (CLARITIN) 10 mg tablet Take 10 mg by mouth daily as needed. Aby Bob MD   clopidogrel (PLAVIX) 75 mg tablet Take 75 mg by mouth daily. Aby Bob MD   acetaminophen (TYLENOL) 500 mg tablet Take 500 mg by mouth four (4) times daily as needed for Pain. Aby Bob MD   aspirin 81 mg chewable tablet Take 81 mg by mouth daily. Aby Bob MD   Spoke with patient this am and notified her of upcoming test and explanation to the patient. Madeleine aware of npo status at this time.  Call to Dr Yuko Yarbrough concerning this pt. Patient resting on stretcher with side rails in up position for safety. All BP's noted on stress strip. Patient monitored throughout test. Physician and RN remained in room with patient throughout test.178/100 hr 58 resp 18 sats on room air 97%. Pt was deemed tele overflow noted by pm nurse earlier today. Patient prepped for test. IV remains patent from floor. 12 lead obtained    1100  Dr. Pedro Nicholson, NICOLÁS and Nuclear Medicine Tech in room, Timeout called. Universal protocol followed. Dr Lucila Valentin explained test to patient and family who were here last pm. No further questions noted. Consent obtained. Waited one hour for iv access and pt to be transported to [facility]. Dr Lucila Valentin aware. Patient lying in bed with arm movt since iv in left foot. Patient IV checked for patency. Normal saline flush 10 cc injected and IV remains patent. 1210  Lexiscan 0.04mg/5ml injected over ten seconds followed by saline flush of 10cc. After 40 seconds Myoview injected by Nuclear Medicine Tech and then saline flush of 10 cc injected to maintain patency of IV.  1220  Patient given snack and soda. Patient [place] monitored in unit until BP and heart rate returned to baseline. Patient cleaned patient up from full brief of yellow colored urine, joslyn care given. 1225    Clean brief placed by this RN. Pt had two areas of scraped red area  1 cm x 1/2 cm one on right side of sacrum one on left on sacral area, noted to nurse and to patient to keep turning to her side so as not to have undue pressure to this site. Rn transported patient by wheelchair Nuclear Medicine area. Iv remained intact. 1225  All personal belongings returned to patient. This RN notes to patient if chest pain or shortness of breath occurs in the future, please return to the Emergency Room. [address] given to floor Nurse Cathy Mack. Pt had 4 0z of regular soda and one pack of four peanut butter crackers.

## 2017-11-22 NOTE — PROGRESS NOTES
Problem: Mobility Impaired (Adult and Pediatric)  Goal: *Acute Goals and Plan of Care (Insert Text)  Physical Therapy Goals  Initiated 11/21/2017  1. Patient will move from supine to sit and sit to supine , scoot up and down and roll side to side in bed with modified independence within 7 day(s). 2.  Patient will transfer from bed to chair and chair to bed with modified independence using the least restrictive device within 7 day(s). 3.  Patient will perform sit to stand with modified independence within 7 day(s). 4.  Patient will ambulate with modified independence for 250 feet with the least restrictive device within 7 day(s). 5.  Patient will ascend/descend 10 stairs with  handrail(s) with minimal assistance/contact guard assist within 7 day(s). physical Therapy TREATMENT  Seen 1415 to 1430  Patient: Ro Bhardwaj (75 y.o. female)  Date: 11/22/2017  Diagnosis: Syncope and collapse Fall as cause of accidental injury at home as place of occurrence       Precautions: Fall    ASSESSMENT:  Pt presents with decreased strength, balance, and functional mobility, in bed on arrival. In standing, BP noted to be 158/84. Pt ambulated 25' with r/w,  vc's and min assist for walker management. Pt has difficulty advancing her LLE during gait, she says this is not new for her. Pt sitting in chair post treatment, /106, no symptoms. PTA, pt living alone however, she will need SNF rehab at discharge. Progression toward goals:  []    Improving appropriately and progressing toward goals  [x]    Improving slowly and progressing toward goals  []    Not making progress toward goals and plan of care will be adjusted     PLAN:  Patient continues to benefit from skilled intervention to address the above impairments. Continue treatment per established plan of care.   Discharge Recommendations:  Russel Sahu  Further Equipment Recommendations for Discharge:  none     SUBJECTIVE:   Patient stated I'm going to eat all this lunch.     OBJECTIVE DATA SUMMARY:   Critical Behavior:  Neurologic State: Alert  Orientation Level: Oriented X4  Cognition: Appropriate decision making, Appropriate for age attention/concentration, Appropriate safety awareness, Follows commands     Functional Mobility Training:  Bed Mobility:  Rolling: Supervision  Supine to Sit: Supervision  Transfers:  Sit to Stand: Minimum assistance  Stand to Sit: Contact guard assistance        Balance:  Sitting: Intact  Standing: Impaired  Standing - Static: Fair  Standing - Dynamic : Fair  Ambulation/Gait Training:  Distance (ft): 25 Feet (ft)  Assistive Device: Walker, rolling  Ambulation - Level of Assistance: Minimal assistance  Gait Abnormalities: Decreased step clearance; Step to gait  Base of Support: Narrowed  Speed/Sofia: Pace decreased (<100 feet/min)    Therapeutic Exercises: Tolerated fair, fatigues quickly  Pain:  Pain Scale 1: Visual  Pain Intensity 1: 0  Pain Location 1: Teeth  Pain Orientation 1: Anterior;Right  Pain Description 1: Aching  Pain Intervention(s) 1: Medication (see MAR)  Activity Tolerance: Tolerated fair, fatigues quickly  Please refer to the flowsheet for vital signs taken during this treatment.   After treatment:   [x]    Patient left in no apparent distress sitting up in chair  []    Patient left in no apparent distress in bed  [x]    Call bell left within reach  [x]    Nursing notified  []    Caregiver present  []    Bed alarm activated    COMMUNICATION/COLLABORATION:   The patients plan of care was discussed with: Registered Nurse    Song Borden PT

## 2017-11-22 NOTE — PROGRESS NOTES
Bedside and Verbal shift change report given to Carlos Eduardo Harp (oncoming nurse) by Valentin Arevalo (offgoing nurse). Report included the following information SBAR, Kardex, ED Summary, MAR, Accordion, Recent Results and Cardiac Rhythm NSR to sinus kendra with PACs, rare PVCs.

## 2017-11-22 NOTE — PROGRESS NOTES
Cardiology:    Uniform VT last night. Mg low, but given the more detailed history obtained from her son last evening, she has had active CAD with recent stenting. The arrhythmia has an ischemic origin unless proven otherwise. Agree with Mg correction. Perfusion testing today. If positive, will give patient option of going [place] [facility] [place] [facility] for updated angiography. The unit clerk is trying to obtain [facility] [place]. Supposedly this patient had PCI during this calendar year.      Impression: Nonsustained VT    Known CAD    Regional anteroseptal hypokinesis suggesting ischemic source    Unknown PCI this year    Syncope with unguarded fall at home    Plan:  Perfusion imaging of myocardium    Likely may need angiography    Junctional rhythm on admission suggests she might eventually need pacing    Transcutaneous nitrate added for now    Kira Mchugh MD

## 2017-11-22 NOTE — PROGRESS NOTES
7398 Notified Dr. Fred Gan of 6 beat run of V-tach patient experienced just prior to EKG being taken at [phone] on 11/21/2017. Patient asymptomatic. No other episodes of V-tach have occurred since and this was also relayed to telehospitalist.     0146 Orders obtained to draw a magnesium and BMP in the AM and print a paper copy of the occurrence and place on chart for Dr. Torito Martinez review. 1272 Noted the result of patient serum magnesium level. Updated Dr. Fred Gan via [employer] regarding magnesium of 1.6 and creatinine WNL. Also updated patient due for stress test this AM.     1780 Dr. Fred Gan to enter orders.

## 2017-11-22 NOTE — INTERDISCIPLINARY ROUNDS
CM rounded with IDT and discussed patient's plan of care. CM will continue to monitor and assist with patient's case management needs. CM has sent a referral to [facility] for SNF and completed the [facility] for personal care services through [facility] and S. The plan is for patient to go to SNF and then receive short term personal care services. [facility] will work on insurance authorization for Friday, 11/24/17. Linda ACKERMAN  [facility]

## 2017-11-22 NOTE — PROGRESS NOTES
Problem: Self Care Deficits Care Plan (Adult)  Goal: *Acute Goals and Plan of Care (Insert Text)  Occupational Therapy Goals  Initiated 11/22/2017  1. Patient will perform grooming and sponge bathing standing at the sink with supervision/set-up within 7 day(s). 2.  Patient will perform upper body dressing with modified independence within 7 day(s). 3.  Patient will perform lower body dressing with minimal assistance/contact guard assist within 7 day(s). 4.  Patient will perform toilet transfers with supervision/set-up within 7 day(s). 5.  Patient will perform all aspects of toileting with supervision/set-up within 7 day(s). 6.  Patient will participate in upper extremity therapeutic exercise/activities with supervision/set-up for 10 minutes within 7 day(s). Occupational Therapy EVALUATION  Patient: Giovanni Quintana (24 y.o. female)  Date: 11/22/2017  Primary Diagnosis: Syncope and collapse        Precautions:   Fall    ASSESSMENT :  Based on the objective data described below, the patient presents with insight into deficits, fearful of walking with walker and aware her balance is off. Noted almost dragging left foot, however with cue able to  and step. Patient aware of need for rehab and increased assist at discharge, recommend rehab. Patient will benefit from skilled intervention to address the above impairments.   Patients rehabilitation potential is considered to be Fair  Factors which may influence rehabilitation potential include:   []             None noted  []             Mental ability/status  []             Medical condition  [x]             Home/family situation and support systems  []             Safety awareness  []             Pain tolerance/management  []             Other:      PLAN :  Recommendations and Planned Interventions:  [x]               Self Care Training                  [x]        Therapeutic Activities  [x]               Functional Mobility Training    [] Cognitive Retraining  [x]               Therapeutic Exercises           [x]        Endurance Activities  [x]               Balance Training                   []        Neuromuscular Re-Education  []               Visual/Perceptual Training     [x]   Home Safety Training  [x]               Patient Education                 [x]        Family Training/Education  []               Other (comment):    Frequency/Duration: Patient will be followed by occupational therapy 3 times a week to address goals. Discharge Recommendations: Skilled Nursing Facility  Further Equipment Recommendations for Discharge: to be determined     SUBJECTIVE:   Patient stated I know my balance is off, I'm scared of it.     OBJECTIVE DATA SUMMARY:   HISTORY:   Past Medical History:   Diagnosis Date    CAD (coronary artery disease)     Coronary stent     CVA     Heart attack     High cholesterol     Hypertension     [facility]roke [facility])     x12 last one sept 2009     Past Surgical History:   Procedure Laterality Date    CARDIAC SURG PROCEDURE UNLIST  2016    Stents    HX ORTHOPAEDIC      left arm surgery for fx. 2010    HX TUBAL LIGATION         Prior Level of Function/Environment/Context: lives alone, however at one point said someone's girlfriend stayed with her, hx of falls, unable to quantify how many  Home Situation  Home Environment: Apartment  One/Two Story Residence: One story  Living Alone: Yes  Support Systems: Family member(s)  Patient Expects to be Discharged to[de-identified] Private residence  Current DME Used/Available at Home: Wellington Lowe, steven, [address] chair  Tub or Shower Type: Tub/Shower combination  []  Right hand dominant   []  Left hand dominant    EXAMINATION OF PERFORMANCE DEFICITS:  Cognitive/Behavioral Status:           Perception: Appears intact  Perseveration: No perseveration noted  Safety/Judgement: Awareness of environment; Fall prevention;Home safety; Insight into deficits    Skin: noted bruising left eye and scabs on forehead    Edema: left eye and nose    Hearing: Auditory  Auditory Impairment: None    Vision/Perceptual:                           Acuity: Able to read clock/calendar on wall without difficulty    Corrective Lenses: Glasses (not in hospital)    Range of Motion:  AROM: Generally decreased, functional                         Strength:  Strength: Generally decreased, functional                Coordination:     Fine Motor Skills-Upper: Left Intact; Right Intact    Gross Motor Skills-Upper: Left Intact; Right Intact       Balance:  Sitting: Intact  Standing: Impaired  Standing - Static: Fair  Standing - Dynamic : Fair    Functional Mobility and Transfers for ADLs:  Bed Mobility:  Rolling: Supervision  Supine to Sit: Supervision    Transfers:  Sit to Stand: Minimum assistance  Stand to Sit: Contact guard assistance  Bed to Chair: Minimum assistance; Adaptive equipment; Additional time;Assist x1 (noted decreased left LE AROM, cues to )  Toilet Transfer : Minimum assistance; Adaptive equipment; Additional time;Assist x1    ADL Assessment:  Feeding: Setup    Oral Facial Hygiene/Grooming: Setup    Bathing: Minimum assistance; Moderate assistance    Upper Body Dressing: Setup;Contact guard assistance    Lower Body Dressing: Minimum assistance (able to cross left LE)    Toileting: Minimum assistance; Moderate assistance                ADL Intervention and task modifications:        educated on role of OT, fall prevention, increased need for assist at home       Patient instructed and indicated understanding the benefits of maintaining activity tolerance, functional mobility, and independence with self care tasks during acute stay  to ensure safe return home and to baseline. Encouraged patient to increase frequency and duration OOB, be out of bed for all meals, perform daily ADLs (as approved by RN/MD regarding bathing etc), and performing functional mobility to/from bathroom.            Cognitive Retraining  Safety/Judgement: Awareness of environment; Fall prevention;Home safety; Insight into deficits      Functional Measure:  Barthel Index:    Bathin  Bladder: 5  Bowels: 5  Groomin  Dressin  Feeding: 10  Mobility: 0  Stairs: 0  Toilet Use: 5  Transfer (Bed to Chair and Back): 10  Total: 45       Barthel and G-code impairment scale:  Percentage of impairment CH  0% CI  1-19% CJ  20-39% CK  40-59% CL  60-79% CM  80-99% CN  100%   Barthel Score 0-100 100 99-80 79-60 59-40 20-39 1-19   0   Barthel Score 0-20 20 17-19 13-16 9-12 5-8 1-4 0      The Barthel ADL Index: Guidelines  1. The index should be used as a record of what a patient does, not as a record of what a patient could do. 2. The main aim is to establish degree of independence from any help, physical or verbal, however minor and for whatever reason. 3. The need for supervision renders the patient not independent. 4. A patient's performance should be established using the best available evidence. Asking the patient, friends/relatives and nurses are the usual sources, but direct observation and common sense are also important. However direct testing is not needed. 5. Usually the patient's performance over the preceding 24-48 hours is important, but occasionally longer periods will be relevant. 6. Middle categories imply that the patient supplies over 50 per cent of the effort. 7. Use of aids to be independent is allowed. Josephine Dumont., Barthel, DNathaylW. (0776). Functional evaluation: the Barthel Index. [facility] (14)2. Sandria Cogan [place], J.J.M.F, Beverley Model., Marilyn Grain., [place], [address] [place] [address] (). Measuring the change indisability after inpatient rehabilitation; comparison of the responsiveness of the Barthel Index and Functional [place] Measure. Journal of Neurology, Neurosurgery, and Psychiatry, 66(4), 259-724.   Mitchell Reddy NNathalyJ.A, OTTONIEL Monson.CECY, & Kendall Krishna M.A. (2004.) Assessment of post-stroke quality of life in cost-effectiveness studies: The usefulness of the Barthel Index and the EuroQoL-5D. Quality of Life Research, 13, 733-52         G codes: In compliance with CMSs Claims Based Outcome Reporting, the following G-code set was chosen for this patient based on their primary functional limitation being treated: The outcome measure chosen to determine the severity of the functional limitation was the Barthel Index with a score of 45/100 which was correlated with the impairment scale. ? Self Care:     - CURRENT STATUS: CK - 40%-59% impaired, limited or restricted    - GOAL STATUS: CJ - 20%-39% impaired, limited or restricted    - D/C STATUS:  ---------------To be determined---------------     Occupational Therapy Evaluation Charge Determination   History Examination Decision-Making   LOW Complexity : Brief history review  LOW Complexity : 1-3 performance deficits relating to physical, cognitive , or psychosocial skils that result in activity limitations and / or participation restrictions  LOW Complexity : No comorbidities that affect functional and no verbal or physical assistance needed to complete eval tasks       Based on the above components, the patient evaluation is determined to be of the following complexity level: LOW   Pain:  No complaint of pain    Activity Tolerance:   Fair   Please refer to the flowsheet for vital signs taken during this treatment. After treatment:   [x] Patient left in no apparent distress sitting up in chair  [] Patient left in no apparent distress in bed  [x] Call bell left within reach  [x] Nursing notified  [] Caregiver present  [] Bed alarm activated    COMMUNICATION/EDUCATION:   The patients plan of care was discussed with: Physical Therapist and Registered Nurse. [x] Home safety education was provided and the patient/caregiver indicated understanding. [x] Patient/family have participated as able in goal setting and plan of care.   [] Patient/family agree to work toward stated goals and plan of care. [] Patient understands intent and goals of therapy, but is neutral about his/her participation. [] Patient is unable to participate in goal setting and plan of care. This patients plan of care is appropriate for delegation to MATT.     Thank you for this referral.  Marc Raman OTR/L  Time Calculation: 19 mins

## 2017-11-22 NOTE — PROGRESS NOTES
Hospitalist Progress Note    NAME: Alona Krabbe   :  1950   MRN:  [id number]   Room Number:  ECE8/92  @ [facility]       Interim Hospital Summary: 69 y.o. female whom presented on 2017 with      Assessment / Plan:    Fall as cause of accidental injury at home as place of occurrence:POA  Recurrent Falls  Gait abnormality  Head CT: Chronic ischemic changes again shown. No acute intracranial  findings. Soft tissue hematoma superficial to the supraorbital left frontal  bone  PT/OT recommend SNF    Hx multiple CVA`s with residual left sided weakness in lower leg:POA  On ASA/Plavix     HTN with chronic Bradycardia:POA  Resume home meds  Cardio on board  For mable scan today  Patient had an episode of NSVT yesterday evening. As per Dr Yuko Yarbrough, she had Junctional rhythm on admission and eventually may need pacing. Hx CAD (coronary artery disease s/p 3 JOHNATHON)  Procedure done at [facility] on 2017  On ASA/plavix and Statin     Underweight  BMI    Code status: Full  Prophylaxis: Lovenox  Recommended Disposition: SNF     Subjective:     Chief Complaint / Reason for Physician Visit  \"I AM BETTER TODAY\". Discussed with RN events overnight. Review of Systems:  Symptom Y/N Comments  Symptom Y/N Comments   Fever/Chills n   Chest Pain n    Poor Appetite n   Edema     Cough n   Abdominal Pain n    Sputum n   Joint Pain y    SOB/PETTY n   Pruritis/Rash n    Nausea/vomit n   Tolerating PT/OT y    Diarrhea n   Tolerating Diet y    Constipation n   Other       Could NOT obtain due to:      Objective:     VITALS:   Last 24hrs VS reviewed since prior progress note.  Most recent are:  Patient Vitals for the past 24 hrs:   Temp Pulse Resp BP SpO2   17 0800 - 61 12 122/72 100 %   17 0600 - 75 16 112/70 99 %   17 0500 - 73 14 113/68 100 %   17 0354 97.5 °F (36.4 °C) 67 17 (!) 146/92 100 %   17 0300 - 65 11 129/77 100 %   17 0200 - 72 16 119/68 100 %   17 0100 - 67 23 106/63 100 %   11/22/17 0000 - 63 21 102/65 100 %   11/21/17 2300 - 72 19 109/73 100 %   11/21/17 2200 - 66 15 125/75 -   11/21/17 2100 97.5 °F (36.4 °C) 70 17 108/69 -   11/21/17 2000 - 68 13 110/62 -   11/21/17 1900 - 75 20 111/69 -   11/21/17 1800 - 74 17 121/75 -   11/21/17 1700 - 66 16 109/74 -   11/21/17 1600 98 °F (36.7 °C) - - - -   11/21/17 1400 - 75 18 129/76 100 %   11/21/17 1351 - - - 134/85 100 %       Intake/Output Summary (Last 24 hours) at 11/22/17 1342  Last data filed at 11/22/17 5581   Gross per 24 hour   Intake           1792.5 ml   Output                0 ml   Net           1792.5 ml        PHYSICAL EXAM:  General: WD, WN. Alert, cooperative, no acute distress    EENT:  EOMI. Anicteric sclerae. MMM  Resp:  CTA bilaterally, no wheezing or rales. No accessory muscle use  CV:  Regular  rhythm,  No edema  GI:  Soft, Non distended, Non tender.  +Bowel sounds  Neurologic:  Alert and oriented X 3, normal speech,   Psych:   Good insight. Not anxious nor agitated  Skin:  No rashes. No jaundice    Reviewed most current lab test results and cultures  YES  Reviewed most current radiology test results   YES  Review and summation of old records today    NO  Reviewed patient's current orders and MAR    YES  PMH/ reviewed - no change compared to H&P  ________________________________________________________________________  Care Plan discussed with:    Comments   Patient x    Family      RN x    Care Manager x    Consultant  x Dr Giovanna Woods team rounds were held today with , nursing, pharmacist and clinical coordinator. Patient's plan of care was discussed; medications were reviewed and discharge planning was addressed.      ________________________________________________________________________  Total NON critical care TIME:  30   Minutes    Total CRITICAL CARE TIME Spent:   Minutes non procedure based      Comments   >50% of visit spent in counseling and coordination of care x    ________________________________________________________________________  Soheila Gorman MD     Procedures: see electronic medical records for all procedures/Xrays and details which were not copied into this note but were reviewed prior to creation of Plan. LABS:  I reviewed today's most current labs and imaging studies.   Pertinent labs include:  Recent Labs      11/20/17 2015   WBC  8.1   HGB  12.8   HCT  39.3   PLT  197     Recent Labs      11/22/17   0353  11/20/17 2015   NA  139  140   K  4.4  4.1   CL  108  104   CO2  27  28   GLU  76  84   BUN  16  23*   CREA  0.69  0.87   CA  8.4*  9.5   MG  1.6   --    ALB   --   3.6   TBILI   --   0.5   SGOT   --   28   ALT   --   22       Signed: Soheila Gorman MD

## 2017-11-23 VITALS
HEART RATE: 92 BPM | BODY MASS INDEX: 15.57 KG/M2 | DIASTOLIC BLOOD PRESSURE: 93 MMHG | OXYGEN SATURATION: 100 % | WEIGHT: 99.21 LBS | HEIGHT: 67 IN | RESPIRATION RATE: 12 BRPM | TEMPERATURE: 97.5 F | SYSTOLIC BLOOD PRESSURE: 121 MMHG

## 2017-11-23 LAB
ANION GAP SERPL CALC-SCNC: 7 MMOL/L (ref 5–15)
BUN SERPL-MCNC: 14 MG/DL (ref 6–20)
BUN/CREAT SERPL: 16 (ref 12–20)
CALCIUM SERPL-MCNC: 8.7 MG/DL (ref 8.5–10.1)
CHLORIDE SERPL-SCNC: 106 MMOL/L (ref 97–108)
CO2 SERPL-SCNC: 26 MMOL/L (ref 21–32)
CREAT SERPL-MCNC: 0.85 MG/DL (ref 0.55–1.02)
GLUCOSE SERPL-MCNC: 76 MG/DL (ref 65–100)
POTASSIUM SERPL-SCNC: 4.6 MMOL/L (ref 3.5–5.1)
SODIUM SERPL-SCNC: 139 MMOL/L (ref 136–145)

## 2017-11-23 PROCEDURE — 80048 BASIC METABOLIC PNL TOTAL CA: CPT | Performed by: INTERNAL MEDICINE

## 2017-11-23 PROCEDURE — 74011250636 HC RX REV CODE- 250/636: Performed by: INTERNAL MEDICINE

## 2017-11-23 PROCEDURE — 74011250637 HC RX REV CODE- 250/637: Performed by: EMERGENCY MEDICINE

## 2017-11-23 PROCEDURE — 77030018729 HC ELECTRD DEFIB PAD CARD -B

## 2017-11-23 PROCEDURE — 74011250637 HC RX REV CODE- 250/637: Performed by: INTERNAL MEDICINE

## 2017-11-23 PROCEDURE — 36415 COLL VENOUS BLD VENIPUNCTURE: CPT | Performed by: INTERNAL MEDICINE

## 2017-11-23 RX ORDER — SULFAMETHOXAZOLE AND TRIMETHOPRIM 800; 160 MG/1; MG/1
1 TABLET ORAL EVERY 12 HOURS
Qty: 2 TAB | Refills: 0 | Status: SHIPPED | OUTPATIENT
Start: 2017-11-23 | End: 2017-11-24

## 2017-11-23 RX ADMIN — CLOPIDOGREL 75 MG: 75 TABLET, FILM COATED ORAL at 09:12

## 2017-11-23 RX ADMIN — ENOXAPARIN SODIUM 30 MG: 30 INJECTION, SOLUTION INTRAVENOUS; SUBCUTANEOUS at 12:17

## 2017-11-23 RX ADMIN — ASPIRIN 81 MG: 81 TABLET, CHEWABLE ORAL at 09:12

## 2017-11-23 RX ADMIN — LISINOPRIL 2.5 MG: 5 TABLET ORAL at 09:00

## 2017-11-23 RX ADMIN — THERA TABS 1 TABLET: TAB at 09:12

## 2017-11-23 RX ADMIN — NITROGLYCERIN 0.5 INCH: 20 OINTMENT TOPICAL at 00:39

## 2017-11-23 RX ADMIN — SULFAMETHOXAZOLE AND TRIMETHOPRIM 1 TABLET: 800; 160 TABLET ORAL at 09:12

## 2017-11-23 RX ADMIN — NITROGLYCERIN 0.5 INCH: 20 OINTMENT TOPICAL at 09:18

## 2017-11-23 RX ADMIN — CITALOPRAM HYDROBROMIDE 10 MG: 20 TABLET ORAL at 09:00

## 2017-11-23 NOTE — PROGRESS NOTES
Cardiology:    Perfusion testing with Lexiscan provocation was uneventful. Imaging demonstrates an increase in overall left ventricular size since the previous study in 2015 with a clear area of abnormal perfusion in the distal anterior wall and apex of the LV with significant reversibility. Given the appearance on echo, and the improvement and wall motion at the apex  after Lexiscan administration, it would appear that the area is far more viable been applied by the sestamibi images. Considered with nonsustained ventricular tachycardia, syncope, and unguarded fall, this lady definitely needs consideration for repeat angiography with possible PCI. The shift to junctional rhythm after her episode of syncope will need to be considered again after angiography and hopefully revascularization. Sinoatrial node unreliability does not suggest LAD disease as suggested by the perfusion images. If he does not have a remediable lesion that affects the conduction system, she should also be considered for possible permanent pacing. She needs to be transferred to the tertiary center of her choice for angiography and possibly assessment by electrophysiology.     Ryan Hairston MD

## 2017-11-23 NOTE — DISCHARGE SUMMARY
Hospitalist Discharge Summary     Patient ID:  Tatum Ron  [id number]  69 y.o.  1950    PCP on record: Kellen Mejia MD    Admit date: 11/20/2017  Discharge date and time: 11/23/2017      Admission Diagnoses: Syncope and collapse    Discharge Diagnoses:    Principal Problem:    Fall as cause of accidental injury at home as place of occurrence (11/20/2017)    Active Problems:    CVA (cerebral vascular accident) ([address] (5/27/2012)      Overview: Old       Bradycardia (11/20/2017)      Facial hematoma (11/20/2017)      CAD (coronary artery disease) (11/21/2017)      Hyperlipidemia (11/21/2017)           Hospital Course:   Syncope:POA? Unknown etiology  Lexiscan done on 11/22-  IMPRESSION:  The test is positive for a myocardial flow reserve abnormality  affecting the apex and distal anterior wall. Of interest, resting hypokinesis of  the apex is reversed after Lexiscan despite the fact that the blood pressure  solitario significantly during the test. The implication is that the affected area is  almost entirely viable, which might be underestimated by any technetium based  isotope. Especially given nonsustained ventricular tachycardia and syncope,  updated angiography will be recommended to the patient.      As per cardio-Sinoatrial node unreliability does not suggest LAD disease as suggested by the perfusion images. If she does not have a remediable lesion that affects the conduction system, she should also be considered for possible permanent pacing. She needs to be transferred to the tertiary center of her choice for angiography and possibly assessment by electrophysiology. Fall as cause of accidental injury at home as place of occurrence:POA  Recurrent Falls  Gait abnormality  Head CT: Chronic ischemic changes again shown. No acute intracranial  findings.  Soft tissue hematoma superficial to the supraorbital left frontal  bone  PT/OT recommend SNF     Hx multiple CVA`s with residual left sided weakness in lower leg:POA  On ASA/Plavix     HTN with chronic Bradycardia:POA  Resume home meds  Cardio on board  No BB     Hx CAD (coronary artery disease s/p 3 JOHNATHON)  [place] [facility] [place]  On ASA/plavix and Statin     Underweight  BMI    Patient is being transferred to [facility]. Case d/w with Dr Miguelina Hines. appreciate his assistance. Dr Yamile Hines. CONSULTATIONS:  IP CONSULT TO CARDIOLOGY    Excerpted HPI from H&P of Mario Churchill MD:  Silvia  is a 69 y.o.  female with PMH of HTN,Multiple CVA,CAD,Recurrent Falls who presents to ED with c/o fall. Patient reports having a GLF as she was heading to the bathroom. She states she hit her head ON the furnace on her left side and now has bruise on left forehead and periorbital swelling . she denies any loss of consciousness prior to or after the fall . She has had multiple Strokes in the past and left-sided residual lower extremity weakness from it. She ambulates with the help of a walker . Patient reports having a history of gait abnormality and recurrent falls . She lives alone and has assistance of her sister who checks on her. In ED,head CT was done which was unremarkable . Cardio for consulted for bradycardia.         ______________________________________________________________________  DISCHARGE SUMMARY/HOSPITAL COURSE:  for full details see H&P, daily progress notes, labs, consult notes. _______________________________________________________________________  Patient seen and examined by me on discharge day. Pertinent Findings:  Gen:    Not in distress  Chest: Clear lungs  CVS:   Regular rhythm. No edema  Abd:  Soft, not distended, not tender  Neuro:  Alert with good insight.   Oriented to person, place, and time   _______________________________________________________________________  DISCHARGE MEDICATIONS:   Current Discharge Medication List      START taking these medications    Details !! trimethoprim-sulfamethoxazole (BACTRIM DS, SEPTRA DS) 160-800 mg per tablet Take 1 Tab by mouth every twelve (12) hours for 1 day. Qty: 2 Tab, Refills: 0       !! - Potential duplicate medications found. Please discuss with provider. CONTINUE these medications which have NOT CHANGED    Details   atorvastatin (LIPITOR) 80 mg tablet Take 80 mg by mouth daily. citalopram (CELEXA) 10 mg tablet Take 10 mg by mouth daily. lisinopril (PRINIVIL, ZESTRIL) 2.5 mg tablet Take 2.5 mg by mouth daily. polyethylene glycol (MIRALAX) 17 gram/dose powder Take 17 g by mouth daily as needed for Other (constipation). mirtazapine (REMERON) 15 mg tablet Take 15 mg by mouth nightly. therapeutic multivitamin (THERAGRAN) tablet Take 1 Tab by mouth daily. nitroglycerin (NITROSTAT) 0.4 mg SL tablet 0.4 mg by SubLINGual route every five (5) minutes as needed for Chest Pain. !! trimethoprim-sulfamethoxazole (BACTRIM DS, SEPTRA DS) 160-800 mg per tablet Take 1 Tab by mouth two (2) times a day. loratadine (CLARITIN) 10 mg tablet Take 10 mg by mouth daily as needed. clopidogrel (PLAVIX) 75 mg tablet Take 75 mg by mouth daily. acetaminophen (TYLENOL) 500 mg tablet Take 500 mg by mouth four (4) times daily as needed for Pain. aspirin 81 mg chewable tablet Take 81 mg by mouth daily. !! - Potential duplicate medications found. Please discuss with provider. My Recommended Diet, Activity, Wound Care, and follow-up labs are listed in the patient's Discharge Insturctions which I have personally completed and reviewed.     _______________________________________________________________________  DISPOSITION:     Home with Family:    Home with HH/PT/OT/RN:    SNF/LTC:    TORO:    OTHER: X       Condition at Discharge:  Stable  _______________________________________________________________________  Follow up with:   PCP : Merlene Mathur MD  Follow-up Information Follow up With Details Comments Contact Info    Aniket Thurman MD Go to  [place] [address]  [phone]      [facility] Office on [address] [facility]. will be contacted by a health  to schedule a home visit.  [address] [facility]  [phone]              Total time in minutes spent coordinating this discharge (includes going over instructions, follow-up, prescriptions, and preparing report for sign off to her PCP) :  35 minutes    Signed:  Rhoda Hendrickson MD

## 2017-11-24 NOTE — PROGRESS NOTES
Late Entry   Patient transferred to [facility] for continue medical care. Notified [facility] of transition to another facility. They will need to contact the CM Department at [facility] for plan of care.      [facility] JEFE RN   [phone]

## 2017-11-25 LAB
ATRIAL RATE: 66 BPM
CALCULATED P AXIS, ECG09: 71 DEGREES
CALCULATED R AXIS, ECG10: 58 DEGREES
CALCULATED T AXIS, ECG11: 73 DEGREES
DIAGNOSIS, 93000: NORMAL
P-R INTERVAL, ECG05: 144 MS
Q-T INTERVAL, ECG07: 452 MS
QRS DURATION, ECG06: 68 MS
QTC CALCULATION (BEZET), ECG08: 473 MS
VENTRICULAR RATE, ECG03: 66 BPM

## 2017-11-27 LAB
ATTENDING PHYSICIAN, CST07: NORMAL
DIAGNOSIS, 93000: NORMAL
DUKE TM SCORE RESULT, CST14: NORMAL
DUKE TREADMILL SCORE, CST13: 1
ECG INTERP BEFORE EX, CST11: NORMAL
ECG INTERP DURING EX, CST12: NORMAL
FUNCTIONAL CAPACITY, CST17: NORMAL
KNOWN CARDIAC CONDITION, CST08: NORMAL
MAX. DIASTOLIC BP, CST04: 109 MMHG
MAX. HEART RATE, CST05: 91 BPM
MAX. SYSTOLIC BP, CST03: 206 MMHG
OVERALL BP RESPONSE TO EXERCISE, CST16: NORMAL
OVERALL HR RESPONSE TO EXERCISE, CST15: NORMAL
PEAK EX METS, CST10: 1 METS
PROTOCOL NAME, CST01: NORMAL
TEST INDICATION, CST09: NORMAL

## 2019-05-20 ENCOUNTER — APPOINTMENT (OUTPATIENT)
Dept: GENERAL RADIOLOGY | Age: 69
End: 2019-05-20
Attending: EMERGENCY MEDICINE
Payer: MEDICARE

## 2019-05-20 ENCOUNTER — HOSPITAL ENCOUNTER (EMERGENCY)
Age: 69
Discharge: HOME | End: 2019-05-20
Attending: EMERGENCY MEDICINE | Admitting: EMERGENCY MEDICINE
Payer: MEDICARE

## 2019-05-20 ENCOUNTER — APPOINTMENT (OUTPATIENT)
Dept: CT IMAGING | Age: 69
End: 2019-05-20
Attending: EMERGENCY MEDICINE
Payer: MEDICARE

## 2019-05-20 VITALS
RESPIRATION RATE: 16 BRPM | WEIGHT: 112 LBS | OXYGEN SATURATION: 99 % | SYSTOLIC BLOOD PRESSURE: 115 MMHG | BODY MASS INDEX: 17.54 KG/M2 | HEART RATE: 72 BPM | DIASTOLIC BLOOD PRESSURE: 83 MMHG | TEMPERATURE: 98 F
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
## 2019-05-20 PROCEDURE — 73060 X-RAY EXAM OF HUMERUS: CPT

## 2019-05-20 PROCEDURE — 70450 CT HEAD/BRAIN W/O DYE: CPT

## 2019-05-20 PROCEDURE — 72110 X-RAY EXAM L-2 SPINE 4/>VWS: CPT

## 2019-05-20 PROCEDURE — 99284 EMERGENCY DEPT VISIT MOD MDM: CPT

## 2019-05-20 PROCEDURE — 72170 X-RAY EXAM OF PELVIS: CPT

## 2019-05-20 NOTE — PROGRESS NOTES
Core Measure patient. CM opened case for assessment of D/C planning needs, CM reviewed chart. Pt presented to ED via with c/o fall. CM attempted to meet with pt, but pt was not present in the room at this time. Miriam Bragg, MSW 
[phone]

## 2019-05-20 NOTE — ED PROVIDER NOTES
EMERGENCY DEPARTMENT HISTORY AND PHYSICAL EXAM      Date: 5/20/2019  Patient Name: Doug Vann    History of Presenting Illness     Chief Complaint   Patient presents with    Fall       History Provided By: Patient and EMS    HPI: Doug Vann, 77 y.o. female with PMHx significant for dementia, CAD, CVA, hyperlipidemia who presents via EMS to the ED with cc of lumbar pain after falling today. Per EMS patient has history of dementia so there is some confusion about whether she actually has back pain or not. States she had a witnessed fall while doing her walker today. Did not hit her head, no LOC. Has intermittently been complaining of lumbar pain since that time. Patient states she fell because she was sleeping while sitting on a stool. She complains of bilateral lower back pain, and also left arm pain. She states she has had a left arm pain since 1971. PMHx: Dementia, hyperlipidemia, CAD, CVA  PSHx: Per records she has had a left arm fracture repair, cardiac stenting, BTL  Social Hx: No EtOH; no smoker; no illicit Drugs    PCP: Thedora Kick, Claudeen Robinson, MD    There are no other complaints, changes, or physical findings at this time. Current Outpatient Medications   Medication Sig Dispense Refill    atorvastatin (LIPITOR) 80 mg tablet Take 80 mg by mouth daily.  citalopram (CELEXA) 10 mg tablet Take 10 mg by mouth daily.  lisinopril (PRINIVIL, ZESTRIL) 2.5 mg tablet Take 2.5 mg by mouth daily.  polyethylene glycol (MIRALAX) 17 gram/dose powder Take 17 g by mouth daily as needed for Other (constipation).  mirtazapine (REMERON) 15 mg tablet Take 15 mg by mouth nightly.  therapeutic multivitamin (THERAGRAN) tablet Take 1 Tab by mouth daily.  nitroglycerin (NITROSTAT) 0.4 mg SL tablet 0.4 mg by SubLINGual route every five (5) minutes as needed for Chest Pain.  loratadine (CLARITIN) 10 mg tablet Take 10 mg by mouth daily as needed.       clopidogrel (PLAVIX) 75 mg tablet Take 75 mg by mouth daily.  acetaminophen (TYLENOL) 500 mg tablet Take 500 mg by mouth four (4) times daily as needed for Pain.  aspirin 81 mg chewable tablet Take 81 mg by mouth daily. Past History     Past Medical History:  Past Medical History:   Diagnosis Date    CAD (coronary artery disease)     Coronary stent     CVA     [facility] [address] [address]     High cholesterol     Hypertension     Stroke [address] [address]     x12 last one sept 2009     Past Surgical History:  Past Surgical History:   Procedure Laterality Date    CARDIAC SURG PROCEDURE UNLIST  2016    Stents    HX ORTHOPAEDIC      left arm surgery for fx. 2010    HX TUBAL LIGATION       Family History:  History reviewed. No pertinent family history. Social History:  Social History     Tobacco Use    Smoking status: Former Smoker    Smokeless tobacco: Never Used   Substance Use Topics    Alcohol use: Yes    Drug use: No     Comment: 1-2 days ago     Allergies: Allergies   Allergen Reactions    Iodinated Contrast- Oral And Iv Dye Hives    Motrin [Ibuprofen] Itching    Penicillins Itching     Review of Systems   Review of Systems   Unable to perform ROS: Dementia     Physical Exam   Physical Exam   Constitutional: She appears well-developed and well-nourished. HENT:   Head: Normocephalic and atraumatic. Eyes: Pupils are equal, round, and reactive to light. Conjunctivae are normal.   Neck: Normal range of motion. Neck supple. No C-spine tenderness or step-off   Cardiovascular: Normal rate, regular rhythm and normal heart sounds. Pulmonary/Chest: Effort normal and breath sounds normal. No respiratory distress. She has no wheezes. She has no rales. Abdominal: Soft. Bowel sounds are normal. She exhibits no distension. There is no tenderness. Musculoskeletal: Normal range of motion. She exhibits no edema or deformity. Mild midline lumbar tenderness. No discrete spinal level. No step-off or deformity.   Faint ecchymosis to the anterior left upper arm, nontender, no deformity. Full range of motion present at the left shoulder and elbow. Full painless range of motion both hips and knees. Pelvis is stable and nontender. Neurological: She is alert. Oriented to self and place     Nursing note and vitals reviewed. Diagnostic Study Results   Labs -   No results found for this or any previous visit (from the past 12 hour(s)). Radiologic Studies -   XR SPINE LUMB MIN 4 V    (Results Pending)   XR HUMERUS LT    (Results Pending)   XR PELV AP ONLY    (Results Pending)     No results found. Medical Decision Making   I am the first provider for this patient. I reviewed the vital signs, available nursing notes, past medical history, past surgical history, family history and social history. Vital Signs-Reviewed the patient's vital signs. Patient Vitals for the past 12 hrs:   Temp Pulse Resp BP SpO2   05/20/19 1718 98 °F (36.7 °C) 62 16 91/62 99 %       Pulse Oximetry Analysis -99 % on RA    Cardiac Monitor:   Rate: 62 bpm  Rhythm: Normal Sinus Rhythm        Records Reviewed: Nursing Notes and Old Medical Records    Provider Notes (Medical Decision Making):   Fracture, strain, contusion    ED Course:   Initial assessment performed. The patients presenting problems have been discussed, and they are in agreement with the care plan formulated and outlined with them. I have encouraged them to ask questions as they arise throughout their visit. Progress Note:     We are struggling to locate family for patient. Disposition:  Discharge    PLAN:  1. Current Discharge Medication List        2. Follow-up Information     Follow up With Specialties Details Why Contact Saida Waldrop MD Internal Medicine, [facility] In 1 day  [facility] [address]  [phone]          Return to ED if worse     Diagnosis     Clinical Impression:   1.  Fall as cause of accidental injury in home as place of occurrence, initial encounter

## 2019-05-20 NOTE — DISCHARGE INSTRUCTIONS
Patient Education        Preventing Falls: Care Instructions  Your Care Instructions    Getting around your home safely can be a challenge if you have injuries or health problems that make it easy for you to fall. Loose rugs and furniture in walkways are among the dangers for many older people who have problems walking or who have poor eyesight. People who have conditions such as arthritis, osteoporosis, or dementia also have to be careful not to fall. You can make your home safer with a few simple measures. Follow-up care is a key part of your treatment and safety. Be sure to make and go to all appointments, and call your doctor if you are having problems. It's also a good idea to know your test results and keep a list of the medicines you take. How can you care for yourself at home? Taking care of yourself  · You may get dizzy if you do not drink enough water. To prevent dehydration, drink plenty of fluids, enough so that your urine is light yellow or clear like water. Choose water and other caffeine-free clear liquids. If you have kidney, heart, or liver disease and have to limit fluids, talk with your doctor before you increase the amount of fluids you drink. · Exercise regularly to improve your strength, muscle tone, and balance. Walk if you can. Swimming may be a good choice if you cannot walk easily. · Have your vision and hearing checked each year or any time you notice a change. If you have trouble seeing and hearing, you might not be able to avoid objects and could lose your balance. · Know the side effects of the medicines you take. Ask your doctor or pharmacist whether the medicines you take can affect your balance. Sleeping pills or sedatives can affect your balance. · Limit the amount of alcohol you drink. Alcohol can impair your balance and other senses. · Ask your doctor whether calluses or corns on your feet need to be removed.  If you wear loose-fitting shoes because of calluses or corns, you can lose your balance and fall. · Talk to your doctor if you have numbness in your feet. Preventing falls at home  · Remove raised doorway thresholds, throw rugs, and clutter. Repair loose carpet or raised areas in the floor. · Move furniture and electrical cords to keep them out of walking paths. · Use nonskid floor wax, and wipe up spills right away, especially on ceramic tile floors. · If you use a walker or cane, put rubber tips on it. If you use crutches, clean the bottoms of them regularly with an abrasive pad, such as steel wool. · Keep your house well lit, especially [address], and outside walkways. Use night-lights in areas such as hallways and bathrooms. Add extra light switches or use remote switches (such as switches that go on or off when you clap your hands) to make it easier to turn lights on if you have to get up during the night. · Install sturdy handrails on stairways. · Move items in your cabinets so that the things you use a lot are on the lower shelves (about waist level). · Keep a cordless phone and a flashlight with new batteries by your bed. If possible, put a phone in each of the main rooms of your house, or carry a cell phone in case you fall and cannot reach a phone. Or, you can wear a device around your neck or wrist. You push a button that sends a signal for help. · Wear low-heeled shoes that fit well and give your feet good support. Use footwear with nonskid soles. Check the heels and soles of your shoes for wear. Repair or replace worn heels or soles. · Do not wear socks without shoes on wood floors. · Walk on the grass when the sidewalks are slippery. If you live in an area that gets snow and ice in the winter, sprinkle salt on slippery steps and sidewalks. Preventing falls in the bath  · Install grab bars and nonskid mats inside and outside your shower or tub and near the toilet and sinks. · Use shower chairs and bath benches.   · Use a hand-held shower head that will allow you to sit while showering. · Get into a tub or shower by putting the weaker leg in first. Get out of a tub or shower with your strong side first.  · Repair loose toilet seats and consider installing a raised toilet seat to make getting on and off the toilet easier. · Keep your bathroom door unlocked while you are in the shower. Where can you learn more? Go to http://swathi-chaka.info/. Enter [id number] [id number] [phone] in the search box to learn more about \"Preventing Falls: Care Instructions. \"  Current as of: March 16, 2018  Content Version: 11.8  © 6387-1697 [employer]wise, [employer] Care instructions adapted under license by [employer] (which disclaims liability or warranty for this information). If you have questions about a medical condition or this instruction, always ask your healthcare professional. [address] any warranty or liability for your use of this information.

## 2019-05-20 NOTE — ED TRIAGE NOTES
Came in by EMS. Reports fall, conflicting reports, one report was walking with walker and fell. Another report is that she states was sitting on stool, was eating and fell from stool to floor. Reports back pain pain, history of dementia. Patient is poor historian.   EMS reports lives with family and family should be coming to ED

## 2019-05-21 NOTE — ED NOTES
Contacted family member listed in chart to inform of patient being ready for discharge, unable to reach family member but was able leave a message. Contacted [facility] to inquire where about where the patient was transported from and if there was a contact number for a family member for the patient. Spoke with Haider Negrete @ [facility], she provided the address of [address] [place] [address], and Vicky as the person who contacted ELMER from [phone].

## 2019-05-21 NOTE — ED NOTES
Contacted Shubham Cunningham at [phone]. Unable to reach family member. Message left on voicemail.

## 2019-08-21 ENCOUNTER — HOSPITAL ENCOUNTER (OUTPATIENT)
Dept: ULTRASOUND IMAGING | Age: 69
Discharge: HOME | End: 2019-08-21
Attending: INTERNAL MEDICINE
Payer: MEDICARE

## 2019-08-21 DIAGNOSIS — N39.0 RECURRENT UTI: ICD-10-CM

## 2019-08-21 PROCEDURE — 76770 US EXAM ABDO BACK WALL COMP: CPT

## 2022-11-09 NOTE — IP AVS SNAPSHOT
-Mgmt as per primary team   [address] [facility] 
 
 
 [address] Patient: Alona Krabbe MRN: [id number] About your hospitalization You were admitted on:  2017 You last received care in the:  [place] [address] [address] [facility] You were discharged on:  2017 Why you were hospitalized Your primary diagnosis was: Fall As Cause Of Accidental Injury At Home As Place Of Occurrence Your diagnoses also included:  Bradycardia, Facial Hematoma, Cva (Cerebral Vascular Accident) (Hcc), Cad (Coronary Artery Disease), Hyperlipidemia Things You Need To Do (next 8 weeks) Go to Concepcion Alexandra MD today Phone:  [phone] Where:  [address] [facility], [address] [facility] [address] Follow up with [facility] Office on Aging You will be contacted by a health  to schedule a home visit. Phone:  [phone] Where:  [address] [address]  AdVolume with [address] 1 at 10:00 AM  
Adult Patient: Patient should drink 32 to 48 ozs of water, clear liquids, tea or juice one hour prior to exam (no carbonated beverages or milk)  Do not void (urinate). Your bladder must be full for this scan to be successful. Once the exam is completed, you can void immediately. GENERAL INSTRUCTIONS 1. Bring any non [facility] facility films/reports pertaining to the area being studied with you on the day of appointment. 2. A written order with a valid diagnosis and Physicians signature is required for all scheduled tests. 3. Check in at registration 30 minutes before your appointment time unless you were instructed to do otherwise.   Pediatric: [place]off[place] to 11years of age: -Encourage water,juice or formula the day of the exam. -If the child is toilet trained, TRY NOT to allow the child to void for one hour prior to the study, so that the bladder will be full. -Please bring a full bottle if your child is an infant, as it may be needed during the exam.  Pediatric: 5 years to 25years old -Finish drinking 28 to 50 ozs. of water, clear liquids,tea or juice one hour prior to the exam ( no carbonated beverages or milk) -Do not void for one hour prior to the procedure. Where:  [facility] [address]) Discharge Orders None A check kevan indicates which time of day the medication should be taken. My Medications TAKE these medications as instructed Instructions Each Dose to Equal  
 Morning Noon Evening Bedtime  
 acetaminophen 500 mg tablet Commonly known as:  TYLENOL Your last dose was: Your next dose is: Take 500 mg by mouth four (4) times daily as needed for Pain. 500 mg  
    
   
   
   
  
 aspirin 81 mg chewable tablet Your last dose was: Your next dose is: Take 81 mg by mouth daily. 81 mg  
    
   
   
   
  
 atorvastatin 80 mg tablet Commonly known as:  LIPITOR Your last dose was: Your next dose is: Take 80 mg by mouth daily. 80 mg  
    
   
   
   
  
 citalopram 10 mg tablet Commonly known as:  Jamila Paulino Your last dose was: Your next dose is: Take 10 mg by mouth daily. 10 mg  
    
   
   
   
  
 lisinopril 2.5 mg tablet Commonly known as:  Vera Shames Your last dose was: Your next dose is: Take 2.5 mg by mouth daily. 2.5 mg  
    
   
   
   
  
 loratadine 10 mg tablet Commonly known as:  Martin Seal Your last dose was: Your next dose is: Take 10 mg by mouth daily as needed. 10 mg MIRALAX 17 gram/dose powder Generic drug:  polyethylene glycol Your last dose was: Your next dose is: Take 17 g by mouth daily as needed for Other (constipation). 17 g  
    
   
   
   
  
 mirtazapine 15 mg tablet Commonly known as:  Marlon Ortega Your last dose was: Your next dose is: Take 15 mg by mouth nightly. 15 mg  
    
   
   
   
  
 nitroglycerin 0.4 mg SL tablet Commonly known as:  NITROSTAT Your last dose was: Your next dose is: 0.4 mg by SubLINGual route every five (5) minutes as needed for Chest Pain. 0.4 mg  
    
   
   
   
  
 PLAVIX 75 mg Tab Generic drug:  clopidogrel Your last dose was: Your next dose is: Take 75 mg by mouth daily. 75 mg  
    
   
   
   
  
 therapeutic multivitamin tablet Commonly known as:  [facility] Your last dose was: Your next dose is: Take 1 Tab by mouth daily. 1 Tab * trimethoprim-sulfamethoxazole 160-800 mg per tablet Commonly known as:  BACTRIM DS, SEPTRA DS Your last dose was: Your next dose is: Take 1 Tab by mouth two (2) times a day. 1 Tab * trimethoprim-sulfamethoxazole 160-800 mg per tablet Commonly known as:  BACTRIM DS, SEPTRA DS Your last dose was: Your next dose is: Take 1 Tab by mouth every twelve (12) hours for 1 day. 1 Tab * Notice: This list has 2 medication(s) that are the same as other medications prescribed for you. Read the directions carefully, and ask your doctor or other care provider to review them with you. Where to Get Your Medications Information on where to get these meds will be given to you by the nurse or doctor. ! Ask your nurse or doctor about these medications  
  trimethoprim-sulfamethoxazole 160-800 mg per tablet Discharge Instructions None [facility] Announcement We are excited to announce that we are making your provider's discharge notes available to you in ROVOP. You will see these notes when they are completed and signed by the physician that discharged you from your recent hospital stay. If you have any questions or concerns about any information you see in ROVOP, please call the Health Information Department where you were seen or reach out to your Primary Care Provider for more information about your plan of care. Introducing [facility]! [facility] introduces ROVOP patient portal. Now you can access parts of your medical record, email your doctor's office, and request medication refills online. 1. In your internet browser, go to [email or web address] 2. Click on the First Time User? Click Here link in the Sign In box. You will see the New Member Sign Up page. 3. Enter your ROVOP Access Code exactly as it appears below. You will not need to use this code after youve completed the sign-up process. If you do not sign up before the expiration date, you must request a new code. · ROVOP Access Code: [email or web address] Expires: 2/14/2018  8:51 AM 
 
4. Enter the last four digits of your Social Security Number (xxxx) and Date of Birth (mm/dd/yyyy) as indicated and click Submit. You will be taken to the next sign-up page. 5. Create a ROVOP ID. This will be your ROVOP login ID and cannot be changed, so think of one that is secure and easy to remember. 6. Create a ROVOP password. You can change your password at any time. 7. Enter your Password Reset Question and Answer. This can be used at a later time if you forget your password. 8. Enter your e-mail address. You will receive e-mail notification when new information is available in [address] 9. Click Sign Up. You can now view and download portions of your medical record.  
10. Click the Download Summary menu link to download a portable copy of your medical information. If you have questions, please visit the Frequently Asked Questions section of the [employer] website. Remember, [employer] is NOT to be used for urgent needs. For medical emergencies, dial 911. Now available from your iPhone and Android! Providers Seen During Your Hospitalization Provider Specialty Primary office phone Usman Frank MD Emergency Medicine [phone] Flower Vance MD Emergency Medicine [phone] Mukul Curran MD Hospitalist [phone] Your Primary Care Physician (PCP) Primary Care Physician Office Phone Office Fax SAMANTHA Nelson [phone] You are allergic to the following Allergen Reactions Iodinated Contrast- Oral And Iv Dye Hives Motrin (Ibuprofen) Itching Penicillins Itching Recent Documentation Height Weight BMI OB Status Smoking Status 1.702 m 45 kg 15.54 kg/m2 Postmenopausal Former Smoker Emergency Contacts Name Discharge Info Relation Home Work Mobile Yarelis Cunningham DISCHARGE CAREGIVER [3] Other Relative [6] [phone] Patient Belongings The following personal items are in your possession at time of discharge: 
  Dental Appliances: None  Visual Aid: Glasses, At home      Home Medications: None   Jewelry: None  Clothing: Footwear, Pants, Shirt    Other Valuables: None Please provide this summary of care documentation to your next provider. Signatures-by signing, you are acknowledging that this After Visit Summary has been reviewed with you and you have received a copy. Patient Signature:  ____________________________________________________________ Date:  ____________________________________________________________  
  
Thee Arriaga Provider Signature:  ____________________________________________________________ Date:  ____________________________________________________________